# Patient Record
Sex: MALE | Race: WHITE | Employment: OTHER | RURAL
[De-identification: names, ages, dates, MRNs, and addresses within clinical notes are randomized per-mention and may not be internally consistent; named-entity substitution may affect disease eponyms.]

---

## 2017-01-20 RX ORDER — RIVAROXABAN 20 MG/1
TABLET, FILM COATED ORAL
Qty: 90 TAB | Refills: 2 | Status: SHIPPED | OUTPATIENT
Start: 2017-01-20 | End: 2017-10-17 | Stop reason: SDUPTHER

## 2017-01-20 RX ORDER — METOPROLOL SUCCINATE 25 MG/1
TABLET, EXTENDED RELEASE ORAL
Qty: 90 TAB | Refills: 2 | Status: SHIPPED | OUTPATIENT
Start: 2017-01-20 | End: 2017-10-17 | Stop reason: SDUPTHER

## 2017-04-03 ENCOUNTER — OFFICE VISIT (OUTPATIENT)
Dept: CARDIOLOGY CLINIC | Age: 77
End: 2017-04-03

## 2017-04-03 VITALS
DIASTOLIC BLOOD PRESSURE: 70 MMHG | RESPIRATION RATE: 18 BRPM | OXYGEN SATURATION: 98 % | HEART RATE: 70 BPM | HEIGHT: 70 IN | WEIGHT: 167 LBS | BODY MASS INDEX: 23.91 KG/M2 | SYSTOLIC BLOOD PRESSURE: 110 MMHG

## 2017-04-03 DIAGNOSIS — E11.9 TYPE 2 DIABETES MELLITUS WITHOUT COMPLICATION, WITHOUT LONG-TERM CURRENT USE OF INSULIN (HCC): ICD-10-CM

## 2017-04-03 DIAGNOSIS — Z98.61 S/P PTCA (PERCUTANEOUS TRANSLUMINAL CORONARY ANGIOPLASTY): ICD-10-CM

## 2017-04-03 DIAGNOSIS — I10 ESSENTIAL HYPERTENSION: ICD-10-CM

## 2017-04-03 DIAGNOSIS — I48.0 PAROXYSMAL ATRIAL FIBRILLATION (HCC): ICD-10-CM

## 2017-04-03 DIAGNOSIS — I25.10 CORONARY ARTERY DISEASE INVOLVING NATIVE CORONARY ARTERY OF NATIVE HEART WITHOUT ANGINA PECTORIS: Primary | ICD-10-CM

## 2017-04-03 RX ORDER — ESCITALOPRAM OXALATE 20 MG/1
20 TABLET ORAL DAILY
COMMUNITY

## 2017-04-03 RX ORDER — ASPIRIN 81 MG/1
81 TABLET ORAL DAILY
Qty: 90 TAB | Refills: 3
Start: 2017-04-03

## 2017-04-03 NOTE — PROGRESS NOTES
Verified patient with two patient identifiers. Medications reviewed/approved by Dr. Krysten Lucas. Verbal from Dr. Krysten Lucas to remove the medications that were deleted during the visit.

## 2017-04-03 NOTE — MR AVS SNAPSHOT
Visit Information Date & Time Provider Department Dept. Phone Encounter #  
 4/3/2017  2:00 PM Patria Zuleta, [address] [facility] [place] [phone] [id number] Your Appointments 10/10/2017 10:00 AM  
ESTABLISHED PATIENT with Patria Zuleta MD  
[address] [facility] [place] [facility]) Appt Note: 6 mo fu $0cp [address] [facility] [phone]  
  
   
 [address] [place], [place] [id number] Upcoming Health Maintenance Date Due HEMOGLOBIN A1C Q6M 1940 FOOT EXAM Q1 7/19/1950 MICROALBUMIN Q1 7/19/1950 EYE EXAM RETINAL OR DILATED Q1 7/19/1950 DTaP/Tdap/Td series (1 - Tdap) 7/19/1961 ZOSTER VACCINE AGE 60> 7/19/2000 GLAUCOMA SCREENING Q2Y 7/19/2005 Pneumococcal 65+ Low/Medium Risk (1 of 2 - PCV13) 7/19/2005 MEDICARE YEARLY EXAM 7/19/2005 INFLUENZA AGE 9 TO ADULT 8/1/2016 LIPID PANEL Q1 3/16/2017 Allergies as of 4/3/2017  Review Complete On: 4/3/2017 By: Patria Zuleta MD  
 No Known Allergies Current Immunizations  Reviewed on 3/15/2016 No immunizations on file. Not reviewed this visit You Were Diagnosed With   
  
 Codes Comments Coronary artery disease involving native coronary artery of native heart without angina pectoris    -  Primary ICD-10-CM: I25.10 ICD-9-CM: 414.01 Paroxysmal atrial fibrillation (HCC)     ICD-10-CM: I48.0 ICD-9-CM: 427.31 Essential hypertension     ICD-10-CM: I10 
ICD-9-CM: 401.9 S/P PTCA (percutaneous transluminal coronary angioplasty)     ICD-10-CM: Z98.61 ICD-9-CM: V45.82 Type 2 diabetes mellitus without complication, without long-term current use of insulin (HCC)     ICD-10-CM: E11.9 ICD-9-CM: 250.00 Vitals  BP Pulse Resp Height(growth percentile) Weight(growth percentile) SpO2  
 110/70 (BP 1 Location: Left arm, BP Patient Position: Sitting) 70 18 5' 10\" (1.778 m) 167 lb (75.8 kg) 98% BMI Smoking Status 23.96 kg/m2 Former Smoker Vitals History BMI and BSA Data Body Mass Index Body Surface Area  
 23.96 kg/m 2 1.93 m 2 Preferred Pharmacy Pharmacy Name Phone [facility] [phone] Your Updated Medication List  
  
   
This list is accurate as of: 4/3/17  2:59 PM.  Always use your most recent med list.  
  
  
  
  
 albuterol 2.5 mg /3 mL (0.083 %) nebulizer solution Commonly known as:  PROVENTIL VENTOLIN  
by Nebulization route once. amLODIPine 5 mg tablet Commonly known as:  Kendall Staggers Take 5 mg by mouth daily. aspirin delayed-release 81 mg tablet Take 1 Tab by mouth daily. atorvastatin 20 mg tablet Commonly known as:  LIPITOR Take 1 Tab by mouth daily. TO HELP PREVENT HEART ATTACK AND STROKE  
  
 escitalopram oxalate 20 mg tablet Commonly known as:  Jovana Cipro Take 20 mg by mouth daily. FLONASE 50 mcg/actuation nasal spray Generic drug:  fluticasone 2 Sprays by Both Nostrils route daily. folic acid 1 mg tablet Commonly known as:  Google Take  by mouth daily. methotrexate 2.5 mg tablet Commonly known as:  Oneida Pieter Take 2.5 mg by mouth. TAKES 4 TABLETS ONCE WEEKLY  
  
 montelukast 10 mg tablet Commonly known as:  SINGULAIR Take 10 mg by mouth daily. OTHER  
TAKES DAILY:  , VITAMIN D3, KRILL OIL, VIT. C, MULTIVITAMIN,  
  
 pantoprazole 40 mg tablet Commonly known as:  PROTONIX Take 1 Tab by mouth Daily (before breakfast). REPLACES PRILOSEC OR OTHER PPI'S DUE TO POTENTIAL INTERACTION WITH PLAVIX  
  
 potassium chloride 20 mEq tablet Commonly known as:  K-DUR, KLOR-CON Take 20 mEq by mouth daily. TOPROL XL 25 mg XL tablet Generic drug:  metoprolol succinate TAKE 1 TABLET DAILY XARELTO 20 mg Tab tablet Generic drug:  rivaroxaban TAKE 1 TABLET DAILY WITH DINNER  
  
 zolpidem 5 mg tablet Commonly known as:  AMBIEN Take  by mouth nightly as needed for Sleep. We Performed the Following AMB POC EKG ROUTINE W/ 12 LEADS, INTER & REP [03128 CPT(R)] To-Do List   
 Around 04/06/2017 Imaging:  NM CARDIAC SPECT W STRS/REST MULT Around 04/06/2017 ECG:  NUCLEAR STRESS TEST Introducing [facility]! Gifty Ansari introduces pyco patient portal. Now you can access parts of your medical record, email your doctor's office, and request medication refills online. 1. In your internet browser, go to [email or web address] 2. Click on the First Time User? Click Here link in the Sign In box. You will see the New Member Sign Up page. 3. Enter your pyco Access Code exactly as it appears below. You will not need to use this code after youve completed the sign-up process. If you do not sign up before the expiration date, you must request a new code. · pyco Access Code: [email or web address] Expires: 7/2/2017  2:58 PM 
 
4. Enter the last four digits of your Social Security Number (xxxx) and Date of Birth (mm/dd/yyyy) as indicated and click Submit. You will be taken to the next sign-up page. 5. Create a pyco ID. This will be your pyco login ID and cannot be changed, so think of one that is secure and easy to remember. 6. Create a pyco password. You can change your password at any time. 7. Enter your Password Reset Question and Answer. This can be used at a later time if you forget your password. 8. Enter your e-mail address. You will receive e-mail notification when new information is available in [address] 9. Click Sign Up. You can now view and download portions of your medical record. 10. Click the Download Summary menu link to download a portable copy of your medical information.  
 
If you have questions, please visit the Frequently Asked Questions section of the [employer]el[employer]. Remember, iyzicohart is NOT to be used for urgent needs. For medical emergencies, dial 911. Now available from your iPhone and Android! Please provide this summary of care documentation to your next provider. Your primary care clinician is listed as Mendez Johnson. If you have any questions after today's visit, please call [phone].

## 2017-04-03 NOTE — PROGRESS NOTES
Declan Waters is a 68 y.o. male is here for routine f/u. No CV sx or complaints. S/p PCI/ANUSHA to prox + mid LAD (ANUSHA x2) 3/16. Continues to see PCP. The patient denies chest pain/ shortness of breath, orthopnea, PND, LE edema, palpitations, syncope, presyncope or fatigue. Patient Active Problem List    Diagnosis Date Noted    CAD (coronary artery disease), native coronary artery 03/16/2016    S/P PTCA (percutaneous transluminal coronary angioplasty) 03/16/2016    Shortness of breath on exertion 03/15/2016    Myocardial ischemia 03/15/2016    DM (diabetes mellitus) ([facility] 02/09/2016    HTN (hypertension) 02/09/2016    Dyslipidemia 02/09/2016    GERD (gastroesophageal reflux disease) 02/09/2016    Ventricular bigeminy 02/09/2016    Paroxysmal atrial fibrillation ([facility] 02/09/2016      Mike Nichole NP  Past Medical History:   Diagnosis Date    Actinic keratosis     DM (diabetes mellitus) ([facility]     Dyslipidemia     Erectile dysfunction     GERD (gastroesophageal reflux disease)     Hypertension     Paroxysmal atrial fibrillation (HCC)     Shingles     Ventricular bigeminy       Past Surgical History:   Procedure Laterality Date    HX CHOLECYSTECTOMY  1995     No Known Allergies   Family History   Problem Relation Age of Onset    Cancer Father      lung      Social History     Social History    Marital status:      Spouse name: N/A    Number of children: N/A    Years of education: N/A     Occupational History    Not on file.      Social History Main Topics    Smoking status: Former Smoker     Quit date: 1/1/1987    Smokeless tobacco: Not on file    Alcohol use No    Drug use: Not on file    Sexual activity: Not on file     Other Topics Concern    Not on file     Social History Narrative      Current Outpatient Prescriptions   Medication Sig    XARELTO 20 mg tab tablet TAKE 1 TABLET DAILY WITH DINNER    TOPROL XL 25 mg XL tablet TAKE 1 TABLET DAILY    fluticasone (FLONASE) 50 mcg/actuation nasal spray 2 Sprays by Both Nostrils route daily.  pantoprazole (PROTONIX) 40 mg tablet Take 1 Tab by mouth Daily (before breakfast). REPLACES PRILOSEC OR OTHER PPI'S DUE TO POTENTIAL INTERACTION WITH PLAVIX    clopidogrel (PLAVIX) 75 mg tablet Take 1 Tab by mouth daily. RISK OF HEART ATTACK IF ANY MISSED DOSES    atorvastatin (LIPITOR) 20 mg tablet Take 1 Tab by mouth daily. TO HELP PREVENT HEART ATTACK AND STROKE    albuterol (PROVENTIL VENTOLIN) 2.5 mg /3 mL (0.083 %) nebulizer solution by Nebulization route once.  zolpidem (AMBIEN) 5 mg tablet Take  by mouth nightly as needed for Sleep.  OTHER TAKES DAILY:   RESVERATROL, VITAMIN D3, KRILL OIL, VIT. C, MULTIVITAMIN, MAGNESIUM, COENZYME Q-10     potassium chloride (K-DUR, KLOR-CON) 20 mEq tablet Take 20 mEq by mouth daily.  montelukast (SINGULAIR) 10 mg tablet Take 10 mg by mouth daily.  folic acid (FOLVITE) 1 mg tablet Take  by mouth daily.  amlodipine (NORVASC) 5 mg tablet Take 5 mg by mouth daily.  lorazepam (ATIVAN) 0.5 mg tablet Take 0.5 mg by mouth nightly.  methotrexate (RHEUMATREX) 2.5 mg tablet Take 2.5 mg by mouth. TAKES 4 TABLETS ONCE WEEKLY     No current facility-administered medications for this visit. Review of Symptoms:    CONST  No weight change. No fever, chills, sweats    ENT No visual changes, URI sx, sore throat    CV  See HPI   RESP  No cough, or sputum, wheezing. Also see HPI   GI  No abdominal pain or change in bowel habits. No heartburn or dysphagia. No melena or rectal bleeding.   No dysuria, urgency, frequency, hematuria   MSKEL  No joint pain, swelling. No muscle pain. SKIN  No rash or lesions. NEURO  No headache, syncope, or seizure. No weakness, loss of sensation, or paresthesias. PSYCH  No low mood or depression  No anxiety. HE/LYMPH  No easy bruising, abnormal bleeding, or enlarged glands.         Physical ExamPhysical Exam:    Visit Vitals    Resp 18    Ht 5' 10\" (1.778 m)    Wt 167 lb (75.8 kg)    BMI 23.96 kg/m2     Gen: NAD  HEENT:  PERRL, throat clear  Neck: no mass or thyromegaly, no JVD   Heart:  Regular,Nl S1S2,  no murmur, gallop or rub.   Lungs:  clear  Abdomen:   Soft, non-tender, bowel sounds are active.   Extremities:  No edema  Pulse: symmetric  Neuro: A&O times 3, WNL      Cardiographics    ECG: normal EKG, normal sinus rhythm, unchanged from previous tracings      Labs:   Lab Results   Component Value Date/Time    Sodium 133 03/16/2016 04:30 AM    Sodium 134 03/15/2016 10:10 PM    Potassium 3.9 03/16/2016 04:30 AM    Potassium 4.1 03/15/2016 10:10 PM    Chloride 98 03/16/2016 04:30 AM    Chloride 98 03/15/2016 10:10 PM    CO2 26 03/16/2016 04:30 AM    CO2 27 03/15/2016 10:10 PM    Anion gap 9 03/16/2016 04:30 AM    Anion gap 9 03/15/2016 10:10 PM    Glucose 98 03/16/2016 04:30 AM    Glucose 154 03/15/2016 10:10 PM    BUN 8 03/16/2016 04:30 AM    BUN 7 03/15/2016 10:10 PM    Creatinine 0.53 03/16/2016 04:30 AM    Creatinine 0.68 03/15/2016 10:10 PM    BUN/Creatinine ratio 15 03/16/2016 04:30 AM    BUN/Creatinine ratio 10 03/15/2016 10:10 PM    GFR est AA >60 03/16/2016 04:30 AM    GFR est AA >60 03/15/2016 10:10 PM    GFR est non-AA >60 03/16/2016 04:30 AM    GFR est non-AA >60 03/15/2016 10:10 PM    Calcium 8.4 03/16/2016 04:30 AM    Calcium 8.3 03/15/2016 10:10 PM     No results found for: CPK, CPKX, CPX  Lab Results   Component Value Date/Time    Cholesterol, total 120 03/16/2016 04:30 AM    HDL Cholesterol 29 03/16/2016 04:30 AM    LDL, calculated 68.8 03/16/2016 04:30 AM    Triglyceride 111 03/16/2016 04:30 AM    CHOL/HDL Ratio 4.1 03/16/2016 04:30 AM     No results found for this or any previous visit.     Assessment:         Patient Active Problem List    Diagnosis Date Noted    CAD (coronary artery disease), native coronary artery 03/16/2016    S/P PTCA (percutaneous transluminal coronary angioplasty) 03/16/2016    Shortness of breath on exertion 03/15/2016    Myocardial ischemia 03/15/2016    DM (diabetes mellitus) ([facility] 02/09/2016    HTN (hypertension) 02/09/2016    Dyslipidemia 02/09/2016    GERD (gastroesophageal reflux disease) 02/09/2016    Ventricular bigeminy 02/09/2016    Paroxysmal atrial fibrillation ([facility] 02/09/2016     No CV sx or complaints. S/p PCI/ANUSHA to prox + mid LAD (ANUSHA x2) 3/16. Continues to see PCP. Plan:     Doing well with no adverse cardiac symptoms. Now one year post PCI/ANUSHA, so can stop the Plavix and restart ASA 81 (is still on Xarelto). Plan repeat Stress MPI (one year post PCI, no real anginal sx prior to PCI and 99 prox LAD in setting of DM). Lipids and labs followed by PCP. Continue current care and f/u in 6 months.     Anjana Wyatt MD

## 2017-04-12 ENCOUNTER — TELEPHONE (OUTPATIENT)
Dept: CARDIOLOGY CLINIC | Age: 77
End: 2017-04-12

## 2017-04-12 NOTE — TELEPHONE ENCOUNTER
----- Message from Pablo Goodpasture, MD sent at 4/12/2017  2:27 PM EDT -----  Regarding: stress MPI  Call/advise stress nuclear scan looks normal, no blockages seen, normal pumping function.   Thanks [facility]

## 2017-10-02 ENCOUNTER — OFFICE VISIT (OUTPATIENT)
Dept: CARDIOLOGY CLINIC | Age: 77
End: 2017-10-02

## 2017-10-02 VITALS
HEART RATE: 68 BPM | OXYGEN SATURATION: 97 % | WEIGHT: 165 LBS | BODY MASS INDEX: 23.62 KG/M2 | RESPIRATION RATE: 18 BRPM | DIASTOLIC BLOOD PRESSURE: 72 MMHG | SYSTOLIC BLOOD PRESSURE: 118 MMHG | HEIGHT: 70 IN

## 2017-10-02 DIAGNOSIS — E11.9 TYPE 2 DIABETES MELLITUS WITHOUT COMPLICATION, WITHOUT LONG-TERM CURRENT USE OF INSULIN (HCC): ICD-10-CM

## 2017-10-02 DIAGNOSIS — I10 ESSENTIAL HYPERTENSION: ICD-10-CM

## 2017-10-02 DIAGNOSIS — Z98.61 S/P PTCA (PERCUTANEOUS TRANSLUMINAL CORONARY ANGIOPLASTY): ICD-10-CM

## 2017-10-02 DIAGNOSIS — E78.5 DYSLIPIDEMIA: ICD-10-CM

## 2017-10-02 DIAGNOSIS — I25.10 CORONARY ARTERY DISEASE INVOLVING NATIVE CORONARY ARTERY OF NATIVE HEART WITHOUT ANGINA PECTORIS: Primary | ICD-10-CM

## 2017-10-02 DIAGNOSIS — I48.0 PAROXYSMAL ATRIAL FIBRILLATION (HCC): ICD-10-CM

## 2017-10-02 RX ORDER — GLUCOSAMINE SULFATE 1500 MG
POWDER IN PACKET (EA) ORAL DAILY
COMMUNITY

## 2017-10-02 RX ORDER — BISMUTH SUBSALICYLATE 262 MG
1 TABLET,CHEWABLE ORAL DAILY
COMMUNITY
End: 2018-04-18 | Stop reason: ALTCHOICE

## 2017-10-02 NOTE — MR AVS SNAPSHOT
Visit Information Date & Time Provider Department Dept. Phone Encounter #  
 10/2/2017  3:00 PM Dina Gonzalez, 1024 [facility] [place] [facility] [phone] [id number] Upcoming Health Maintenance Date Due HEMOGLOBIN A1C Q6M 1940 FOOT EXAM Q1 7/19/1950 MICROALBUMIN Q1 7/19/1950 EYE EXAM RETINAL OR DILATED Q1 7/19/1950 DTaP/Tdap/Td series (1 - Tdap) 7/19/1961 ZOSTER VACCINE AGE 60> 5/19/2000 GLAUCOMA SCREENING Q2Y 7/19/2005 Pneumococcal 65+ Low/Medium Risk (1 of 2 - PCV13) 7/19/2005 MEDICARE YEARLY EXAM 7/19/2005 LIPID PANEL Q1 3/16/2017 INFLUENZA AGE 9 TO ADULT 8/1/2017 Allergies as of 10/2/2017  Review Complete On: 10/2/2017 By: Dina Gonzalez MD  
 No Known Allergies Current Immunizations  Reviewed on 3/15/2016 No immunizations on file. Not reviewed this visit You Were Diagnosed With   
  
 Codes Comments Coronary artery disease involving native coronary artery of native heart without angina pectoris    -  Primary ICD-10-CM: I25.10 ICD-9-CM: 414.01 Paroxysmal atrial fibrillation (HCC)     ICD-10-CM: I48.0 ICD-9-CM: 427.31 Essential hypertension     ICD-10-CM: I10 
ICD-9-CM: 401.9 S/P PTCA (percutaneous transluminal coronary angioplasty)     ICD-10-CM: Z98.61 ICD-9-CM: V45.82 Type 2 diabetes mellitus without complication, without long-term current use of insulin (HCC)     ICD-10-CM: E11.9 ICD-9-CM: 250.00 Dyslipidemia     ICD-10-CM: E78.5 ICD-9-CM: 272.4 Vitals BP Pulse Resp Height(growth percentile) Weight(growth percentile) SpO2  
 118/72 (BP 1 Location: Left arm, BP Patient Position: Sitting) 68 18 5' 10\" (1.778 m) 165 lb (74.8 kg) 97% BMI Smoking Status 23.68 kg/m2 Former Smoker Vitals History BMI and BSA Data Body Mass Index Body Surface Area  
 23.68 kg/m 2 1.92 m 2 Preferred Pharmacy Pharmacy Name Phone [address] [facility] [phone] Your Updated Medication List  
  
   
This list is accurate as of: 10/2/17  3:35 PM.  Always use your most recent med list.  
  
  
  
  
 albuterol 2.5 mg /3 mL (0.083 %) nebulizer solution Commonly known as:  PROVENTIL VENTOLIN  
by Nebulization route once. amLODIPine 5 mg tablet Commonly known as:  Suraj Janes Take 5 mg by mouth daily. aspirin delayed-release 81 mg tablet Take 1 Tab by mouth daily. atorvastatin 20 mg tablet Commonly known as:  LIPITOR Take 1 Tab by mouth daily. TO HELP PREVENT HEART ATTACK AND STROKE  
  
 escitalopram oxalate 20 mg tablet Commonly known as:  Naveed Leriche Take 20 mg by mouth daily. FLONASE 50 mcg/actuation nasal spray Generic drug:  fluticasone 2 Sprays by Both Nostrils route daily. folic acid 1 mg tablet Commonly known as:  Katlin Take  by mouth daily. methotrexate 2.5 mg tablet Commonly known as:  Ozzie Ghotra Take 2.5 mg by mouth. TAKES 4 TABLETS ONCE WEEKLY  
  
 montelukast 10 mg tablet Commonly known as:  SINGULAIR Take 10 mg by mouth daily. multivitamin tablet Commonly known as:  ONE A DAY Take 1 Tab by mouth daily. OTHER  
TAKES DAILY:  , VITAMIN D3, KRILL OIL, VIT. C, MULTIVITAMIN,  
  
 pantoprazole 40 mg tablet Commonly known as:  PROTONIX Take 1 Tab by mouth Daily (before breakfast). REPLACES PRILOSEC OR OTHER PPI'S DUE TO POTENTIAL INTERACTION WITH PLAVIX  
  
 potassium chloride 20 mEq tablet Commonly known as:  K-DUR, KLOR-CON Take 20 mEq by mouth daily. TOPROL XL 25 mg XL tablet Generic drug:  metoprolol succinate TAKE 1 TABLET DAILY  
  
 VITAMIN C PO Take  by mouth. VITAMIN D3 1,000 unit Cap Generic drug:  cholecalciferol Take  by mouth daily. XARELTO 20 mg Tab tablet Generic drug:  rivaroxaban TAKE 1 TABLET DAILY WITH DINNER  
  
 zolpidem 5 mg tablet Commonly known as:  AMBIEN Take  by mouth nightly as needed for Sleep. We Performed the Following AMB POC EKG ROUTINE W/ 12 LEADS, INTER & REP [79441 CPT(R)] Introducing [facility]! [facility] introduces [employer] patient portal. Now you can access parts of your medical record, email your doctor's office, and request medication refills online. 1. In your internet browser, go to [email or web address] 2. Click on the First Time User? Click Here link in the Sign In box. You will see the New Member Sign Up page. 3. Enter your [employer] Access Code exactly as it appears below. You will not need to use this code after youve completed the sign-up process. If you do not sign up before the expiration date, you must request a new code. · [employer] Access Code: [facility] Expires: 12/31/2017  2:35 PM 
 
4. Enter the last four digits of your Social Security Number (xxxx) and Date of Birth (mm/dd/yyyy) as indicated and click Submit. You will be taken to the next sign-up page. 5. Create a [employer] ID. This will be your [employer] login ID and cannot be changed, so think of one that is secure and easy to remember. 6. Create a [employer] password. You can change your password at any time. 7. Enter your Password Reset Question and Answer. This can be used at a later time if you forget your password. 8. Enter your e-mail address. You will receive e-mail notification when new information is available in [address] 9. Click Sign Up. You can now view and download portions of your medical record. 10. Click the Download Summary menu link to download a portable copy of your medical information. If you have questions, please visit the Frequently Asked Questions section of the [employer] website. Remember, [employer] is NOT to be used for urgent needs. For medical emergencies, dial 911. Now available from your iPhone and Android!

## 2017-10-02 NOTE — PROGRESS NOTES
Amairani Garvin is a 68 y.o. male is here for routine f/u. No CV sx or complaints. No afib he is aware of. Atorvastatin dose reduced by PCP. Continues on ASA 81 and Xarelto. The patient denies chest pain/ shortness of breath, orthopnea, PND, LE edema, palpitations, syncope, presyncope or fatigue. Patient Active Problem List    Diagnosis Date Noted    CAD (coronary artery disease), native coronary artery 03/16/2016    S/P PTCA (percutaneous transluminal coronary angioplasty) 03/16/2016    Shortness of breath on exertion 03/15/2016    Myocardial ischemia 03/15/2016    DM [facility]di[facility] mellitus[facility] 02/09/2016    HTN (hypertension) 02/09/2016    Dyslipidemia 02/09/2016    GERD (gastroesophageal reflux disease) 02/09/2016    Ventricular bigeminy 02/09/2016    [facility] 02/09/2016      Corinne Kumari NP  Past Medical History:   Diagnosis Date    Actinic keratosis     [facility]     Dyslipidemia     Erectile dysfunction     GERD (gastroesophageal reflux disease)     Hypertension     Paroxysmal atrial fibrillation (HCC)     Shingles     Ventricular bigeminy       Past Surgical History:   Procedure Laterality Date    HX CHOLECYSTECTOMY  1995     No Known Allergies   Family History   Problem Relation Age of Onset    Cancer Father      lung      Social History     Social History    Marital status:      Spouse name: N/A    Number of children: N/A    Years of education: N/A     Occupational History    Not on file. Social History Main Topics    Smoking status: Former Smoker     Quit date: 1/1/1987    Smokeless tobacco: Not on file    Alcohol use No    Drug use: Not on file    Sexual activity: Not on file     Other Topics Concern    Not on file     Social History Narrative      Current Outpatient Prescriptions   Medication Sig    multivitamin (ONE A DAY) tablet Take 1 Tab by mouth daily.     ASCORBATE CALCIUM (VITAMIN C PO) Take by mouth.  cholecalciferol (VITAMIN D3) 1,000 unit cap Take  by mouth daily.  escitalopram oxalate (LEXAPRO) 20 mg tablet Take 20 mg by mouth daily.  aspirin delayed-release 81 mg tablet Take 1 Tab by mouth daily.  XARELTO 20 mg tab tablet TAKE 1 TABLET DAILY WITH DINNER    TOPROL XL 25 mg XL tablet TAKE 1 TABLET DAILY    fluticasone (FLONASE) 50 mcg/actuation nasal spray 2 Sprays by Both Nostrils route daily.  pantoprazole (PROTONIX) 40 mg tablet Take 1 Tab by mouth Daily (before breakfast). REPLACES PRILOSEC OR OTHER PPI'S DUE TO POTENTIAL INTERACTION WITH PLAVIX    atorvastatin (LIPITOR) 20 mg tablet Take 1 Tab by mouth daily. TO HELP PREVENT HEART ATTACK AND STROKE (Patient taking differently: Take 10 mg by mouth daily. TO HELP PREVENT HEART ATTACK AND STROKE)    albuterol (PROVENTIL VENTOLIN) 2.5 mg /3 mL (0.083 %) nebulizer solution by Nebulization route once.  zolpidem (AMBIEN) 5 mg tablet Take  by mouth nightly as needed for Sleep.  OTHER TAKES DAILY:  , VITAMIN D3, KRILL OIL, VIT. C, MULTIVITAMIN,    potassium chloride (K-DUR, KLOR-CON) 20 mEq tablet Take 20 mEq by mouth daily.  montelukast (SINGULAIR) 10 mg tablet Take 10 mg by mouth daily.  folic acid (FOLVITE) 1 mg tablet Take  by mouth daily.  amlodipine (NORVASC) 5 mg tablet Take 5 mg by mouth daily.  methotrexate (RHEUMATREX) 2.5 mg tablet Take 2.5 mg by mouth. TAKES 4 TABLETS ONCE WEEKLY     No current facility-administered medications for this visit. Review of Symptoms:    CONST  No weight change. No fever, chills, sweats    ENT No visual changes, URI sx, sore throat    CV  See HPI   RESP  No cough, or sputum, wheezing. Also see HPI   GI  No abdominal pain or change in bowel habits. No heartburn or dysphagia. No melena or rectal bleeding.   No dysuria, urgency, frequency, hematuria   MSKEL  No joint pain, swelling. No muscle pain. SKIN  No rash or lesions.     NEURO  No headache, syncope, or seizure. No weakness, loss of sensation, or paresthesias. PSYCH  No low mood or depression  No anxiety. HE/LYMPH  No easy bruising, abnormal bleeding, or enlarged glands.         Physical ExamPhysical Exam:    Visit Vitals    /72 (BP 1 Location: Left arm, BP Patient Position: Sitting)    Pulse 68    Resp 18    Ht 5' 10\" (1.778 m)    Wt 165 lb (74.8 kg)    SpO2 97%    BMI 23.68 kg/m2     Gen: NAD  HEENT:  PERRL, throat clear  Neck: no adenopathy, no thyromegaly, no JVD   Heart:  Regular,Nl S1S2,  no murmur, gallop or rub.   Lungs:  clear  Abdomen:   Soft, non-tender, bowel sounds are active.   Extremities:  No edema  Pulse: symmetric  Neuro: A&O times 3, No focal neuro deficits    Cardiographics    ECG: NSR, first degree AV block    Labs:   Lab Results   Component Value Date/Time    Sodium 133 03/16/2016 04:30 AM    Sodium 134 03/15/2016 10:10 PM    Potassium 3.9 03/16/2016 04:30 AM    Potassium 4.1 03/15/2016 10:10 PM    Chloride 98 03/16/2016 04:30 AM    Chloride 98 03/15/2016 10:10 PM    CO2 26 03/16/2016 04:30 AM    CO2 27 03/15/2016 10:10 PM    Anion gap 9 03/16/2016 04:30 AM    Anion gap 9 03/15/2016 10:10 PM    Glucose 98 03/16/2016 04:30 AM    Glucose 154 03/15/2016 10:10 PM    BUN 8 03/16/2016 04:30 AM    BUN 7 03/15/2016 10:10 PM    Creatinine 0.53 03/16/2016 04:30 AM    Creatinine 0.68 03/15/2016 10:10 PM    BUN/Creatinine ratio 15 03/16/2016 04:30 AM    BUN/Creatinine ratio 10 03/15/2016 10:10 PM    GFR est AA >60 03/16/2016 04:30 AM    GFR est AA >60 03/15/2016 10:10 PM    GFR est non-AA >60 03/16/2016 04:30 AM    GFR est non-AA >60 03/15/2016 10:10 PM    Calcium 8.4 03/16/2016 04:30 AM    Calcium 8.3 03/15/2016 10:10 PM     No results found for: CPK, CPKX, CPX  Lab Results   Component Value Date/Time    Cholesterol, total 120 03/16/2016 04:30 AM    HDL Cholesterol 29 03/16/2016 04:30 AM    LDL, calculated 68.8 03/16/2016 04:30 AM    Triglyceride 111 03/16/2016 04:30 AM    CHOL/HDL Ratio 4.1 03/16/2016 04:30 AM     No results found for this or any previous visit. Assessment:         Patient Active Problem List    Diagnosis Date Noted    CAD (coronary artery disease), native coronary artery 03/16/2016    S/P PTCA (percutaneous transluminal coronary angioplasty) 03/16/2016    Shortness of breath on exertion 03/15/2016    Myocardial ischemia 03/15/2016    [facility] 02/09/2016    HTN (hypertension) 02/09/2016    Dyslipidemia 02/09/2016    GERD (gastroesophageal reflux disease) 02/09/2016    Ventricular bigeminy 02/09/2016    Paroxysmal atrial fibrillation ([facility] 02/09/2016     No CV sx or complaints. No afib he is aware of. Atorvastatin dose reduced by PCP. Continues on ASA 81 and Xarelto. Plan:     Doing well with no adverse cardiac symptoms. Lipids and labs followed by PCP. Continue current care and f/u in 6 months.     Ema Fritz MD

## 2017-10-17 RX ORDER — METOPROLOL SUCCINATE 25 MG/1
TABLET, EXTENDED RELEASE ORAL
Qty: 90 TAB | Refills: 2 | Status: SHIPPED | OUTPATIENT
Start: 2017-10-17 | End: 2018-07-14 | Stop reason: SDUPTHER

## 2017-10-17 RX ORDER — RIVAROXABAN 20 MG/1
TABLET, FILM COATED ORAL
Qty: 90 TAB | Refills: 2 | Status: SHIPPED | OUTPATIENT
Start: 2017-10-17 | End: 2018-07-14 | Stop reason: SDUPTHER

## 2018-04-18 ENCOUNTER — OFFICE VISIT (OUTPATIENT)
Dept: CARDIOLOGY CLINIC | Age: 78
End: 2018-04-18

## 2018-04-18 VITALS
OXYGEN SATURATION: 97 % | BODY MASS INDEX: 25.34 KG/M2 | SYSTOLIC BLOOD PRESSURE: 122 MMHG | RESPIRATION RATE: 18 BRPM | WEIGHT: 177 LBS | DIASTOLIC BLOOD PRESSURE: 68 MMHG | HEART RATE: 66 BPM | HEIGHT: 70 IN

## 2018-04-18 DIAGNOSIS — E11.9 TYPE 2 DIABETES MELLITUS WITHOUT COMPLICATION, WITHOUT LONG-TERM CURRENT USE OF INSULIN (HCC): ICD-10-CM

## 2018-04-18 DIAGNOSIS — I25.10 CORONARY ARTERY DISEASE INVOLVING NATIVE CORONARY ARTERY OF NATIVE HEART WITHOUT ANGINA PECTORIS: Primary | ICD-10-CM

## 2018-04-18 DIAGNOSIS — E78.5 DYSLIPIDEMIA: ICD-10-CM

## 2018-04-18 DIAGNOSIS — Z98.61 S/P PTCA (PERCUTANEOUS TRANSLUMINAL CORONARY ANGIOPLASTY): ICD-10-CM

## 2018-04-18 DIAGNOSIS — I10 ESSENTIAL HYPERTENSION: ICD-10-CM

## 2018-04-18 DIAGNOSIS — I48.0 PAROXYSMAL ATRIAL FIBRILLATION (HCC): ICD-10-CM

## 2018-04-18 RX ORDER — BISMUTH SUBSALICYLATE 262 MG
1 TABLET,CHEWABLE ORAL DAILY
COMMUNITY

## 2018-04-18 NOTE — PROGRESS NOTES
Verified patient with two patient identifiers. Medications reviewed/approved by Dr. John Salgado. Verbal from Dr. John Salgado to remove the medications that were deleted during the visit. Chief Complaint   Patient presents with    Coronary Artery Disease     6 month follow up    Irregular Heart Beat    Hypertension    Cholesterol Problem     1. Have you been to the ER, urgent care clinic since your last visit? Hospitalized since your last visit?no    2. Have you seen or consulted any other health care providers outside of the [facility] since your last visit? Include any pap smears or colon screening.  Yes, Mary Mccain NP (few weeks ago per the pt.)

## 2018-04-18 NOTE — PROGRESS NOTES
Dante Kirby is a 68 y.o. male is here for routine f/u. No CV sx or complaints. No afib he is aware of. Lipids followed by PCP. Continues on ASA 81 and Xarelto. The patient denies chest pain/ shortness of breath, orthopnea, PND, LE edema, palpitations, syncope, presyncope or fatigue. Patient Active Problem List    Diagnosis Date Noted    CAD (coronary artery disease), native coronary artery 03/16/2016    S/P PTCA (percutaneous transluminal coronary angioplasty) 03/16/2016    Shortness of breath on exertion 03/15/2016    Myocardial ischemia 03/15/2016    DM [facility]di[facility] mellitus) [facility] 02/09/2016    HTN (hypertension) 02/09/2016    Dyslipidemia 02/09/2016    GERD (gastroesophageal reflux disease) 02/09/2016    Ventricular bigeminy 02/09/2016    Paroxysmal atrial fibrillation ([facility] 02/09/2016      Luke Bo NP  Past Medical History:   Diagnosis Date    Actinic keratosis     [facility]     Dyslipidemia     Erectile dysfunction     GERD (gastroesophageal reflux disease)     Hypertension     Paroxysmal atrial fibrillation (HCC)     Shingles     Ventricular bigeminy       Past Surgical History:   Procedure Laterality Date    HX CHOLECYSTECTOMY  1995     No Known Allergies   Family History   Problem Relation Age of Onset    Cancer Father      lung      Social History     Social History    Marital status:      Spouse name: N/A    Number of children: N/A    Years of education: N/A     Occupational History    Not on file.      Social History Main Topics    Smoking status: Former Smoker     Quit date: 1/1/1987    Smokeless tobacco: Not on file    Alcohol use No    Drug use: Not on file    Sexual activity: Not on file     Other Topics Concern    Not on file     Social History Narrative      Current Outpatient Prescriptions   Medication Sig    TOPROL XL 25 mg XL tablet TAKE 1 TABLET DAILY    XARELTO 20 mg tab tablet TAKE 1 TABLET DAILY WITH DINNER    multivitamin (ONE A DAY) tablet Take 1 Tab by mouth daily.  ASCORBATE CALCIUM (VITAMIN C PO) Take  by mouth.  cholecalciferol (VITAMIN D3) 1,000 unit cap Take  by mouth daily.  escitalopram oxalate (LEXAPRO) 20 mg tablet Take 20 mg by mouth daily.  aspirin delayed-release 81 mg tablet Take 1 Tab by mouth daily.  fluticasone (FLONASE) 50 mcg/actuation nasal spray 2 Sprays by Both Nostrils route daily.  pantoprazole (PROTONIX) 40 mg tablet Take 1 Tab by mouth Daily (before breakfast). REPLACES PRILOSEC OR OTHER PPI'S DUE TO POTENTIAL INTERACTION WITH PLAVIX    atorvastatin (LIPITOR) 20 mg tablet Take 1 Tab by mouth daily. TO HELP PREVENT HEART ATTACK AND STROKE (Patient taking differently: Take 10 mg by mouth daily. TO HELP PREVENT HEART ATTACK AND STROKE)    albuterol (PROVENTIL VENTOLIN) 2.5 mg /3 mL (0.083 %) nebulizer solution by Nebulization route once.  zolpidem (AMBIEN) 5 mg tablet Take  by mouth nightly as needed for Sleep.  OTHER TAKES DAILY:  , VITAMIN D3, KRILL OIL, VIT. C, MULTIVITAMIN,    potassium chloride (K-DUR, KLOR-CON) 20 mEq tablet Take 20 mEq by mouth daily.  montelukast (SINGULAIR) 10 mg tablet Take 10 mg by mouth daily.  folic acid (FOLVITE) 1 mg tablet Take  by mouth daily.  amlodipine (NORVASC) 5 mg tablet Take 5 mg by mouth daily.  methotrexate (RHEUMATREX) 2.5 mg tablet Take 2.5 mg by mouth. TAKES 4 TABLETS ONCE WEEKLY     No current facility-administered medications for this visit. Review of Symptoms:    CONST  No weight change. No fever, chills, sweats    ENT No visual changes, URI sx, sore throat    CV  See HPI   RESP  No cough, or sputum, wheezing. Also see HPI   GI  No abdominal pain or change in bowel habits. No heartburn or dysphagia. No melena or rectal bleeding.   No dysuria, urgency, frequency, hematuria   MSKEL  No joint pain, swelling. No muscle pain. SKIN  No rash or lesions. NEURO  No headache, syncope, or seizure. No weakness, loss of sensation, or paresthesias. PSYCH  No low mood or depression  No anxiety. HE/LYMPH  No easy bruising, abnormal bleeding, or enlarged glands.         Physical ExamPhysical Exam:    Visit Vitals    Resp 18    Ht 5' 10\" (1.778 m)    Wt 177 lb (80.3 kg)    BMI 25.4 kg/m2     Gen: NAD  HEENT:  PERRL, throat clear  Neck: no adenopathy, no thyromegaly, no JVD   Heart:  Regular,Nl S1S2,  no murmur, gallop or rub.   Lungs:  clear  Abdomen:   Soft, non-tender, bowel sounds are active.   Extremities:  No edema  Pulse: symmetric  Neuro: A&O times 3, No focal neuro deficits    Cardiographics    ECG: NSR, no acute changes, first degree AV block    Labs:   Lab Results   Component Value Date/Time    Sodium 133 (L) 03/16/2016 04:30 AM    Sodium 134 (L) 03/15/2016 10:10 PM    Potassium 3.9 03/16/2016 04:30 AM    Potassium 4.1 03/15/2016 10:10 PM    Chloride 98 03/16/2016 04:30 AM    Chloride 98 03/15/2016 10:10 PM    CO2 26 03/16/2016 04:30 AM    CO2 27 03/15/2016 10:10 PM    Anion gap 9 03/16/2016 04:30 AM    Anion gap 9 03/15/2016 10:10 PM    Glucose 98 03/16/2016 04:30 AM    Glucose 154 (H) 03/15/2016 10:10 PM    BUN 8 03/16/2016 04:30 AM    BUN 7 03/15/2016 10:10 PM    Creatinine 0.53 (L) 03/16/2016 04:30 AM    Creatinine 0.68 (L) 03/15/2016 10:10 PM    BUN/Creatinine ratio 15 03/16/2016 04:30 AM    BUN/Creatinine ratio 10 (L) 03/15/2016 10:10 PM    GFR est AA >60 03/16/2016 04:30 AM    GFR est AA >60 03/15/2016 10:10 PM    GFR est non-AA >60 03/16/2016 04:30 AM    GFR est non-AA >60 03/15/2016 10:10 PM    Calcium 8.4 (L) 03/16/2016 04:30 AM    Calcium 8.3 (L) 03/15/2016 10:10 PM     No results found for: CPK, CPKX, CPX  Lab Results   Component Value Date/Time    Cholesterol, total 120 03/16/2016 04:30 AM    HDL Cholesterol 29 03/16/2016 04:30 AM    LDL, calculated 68.8 03/16/2016 04:30 AM    Triglyceride 111 03/16/2016 04:30 AM    CHOL/HDL Ratio 4.1 03/16/2016 04:30 AM     No results found for this or any previous visit. Assessment:         Patient Active Problem List    Diagnosis Date Noted    CAD (coronary artery disease), native coronary artery 03/16/2016    S/P PTCA (percutaneous transluminal coronary angioplasty) 03/16/2016    Shortness of breath on exertion 03/15/2016    Myocardial ischemia 03/15/2016    DM (diabetes mellitus) ([facility] 02/09/2016    HTN (hypertension) 02/09/2016    Dyslipidemia 02/09/2016    GERD (gastroesophageal reflux disease) 02/09/2016    Ventricular bigeminy 02/09/2016    Paroxysmal atrial fibrillation ([facility] 02/09/2016     No CV sx or complaints. No afib he is aware of. Lipids followed by PCP. Continues on ASA 81 and Xarelto. Plan:     Doing well with no adverse cardiac symptoms. Lipids and labs followed by PCP. Continue current care and f/u in 6 months.     Nikunj Youngblood MD

## 2018-04-18 NOTE — MR AVS SNAPSHOT
[address] 
 
 
 [address] [facility] [address] [address] [phone] Patient: Maci Wilkinson MRN: [id number] Visit Information Date & Time Provider Department Dept. Phone Encounter #  
 2018  3:40 PM Marlon Marley, [address] [facility] [phone] [id number] Follow-up Instructions Return in about 6 months (around 10/18/2018). Your Appointments 10/31/2018  1:00 PM  
ESTABLISHED PATIENT with Marlon Marley MD  
[address] [place] [facility] [address] [facility]) Appt Note: 6 mo fu $0cp [address] [facility] [address] [id number] [phone]  
  
   
 [address] [place] [id number] Upcoming Health Maintenance Date Due HEMOGLOBIN A1C Q6M 1940 FOOT EXAM Q1 1950 MICROALBUMIN Q1 1950 EYE EXAM RETINAL OR DILATED Q1 1950 DTaP/Tdap/Td series (1 - Tdap) 1961 ZOSTER VACCINE AGE 60> 2000 GLAUCOMA SCREENING Q2Y 2005 Pneumococcal 65+ Low/Medium Risk (1 of 2 - PCV13) 2005 LIPID PANEL Q1 3/16/2017 Influenza Age 5 to Adult 2017 MEDICARE YEARLY EXAM 3/14/2018 Allergies as of 2018  Review Complete On: 2018 By: Marlon Marley MD  
 No Known Allergies Current Immunizations  Reviewed on 3/15/2016 No immunizations on file. Not reviewed this visit You Were Diagnosed With   
  
 Codes Comments Coronary artery disease involving native coronary artery of native heart without angina pectoris    -  Primary ICD-10-CM: I25.10 ICD-9-CM: 414.01 Essential hypertension     ICD-10-CM: I10 
ICD-9-CM: 401.9 Paroxysmal atrial fibrillation (HCC)     ICD-10-CM: I48.0 ICD-9-CM: 427.31 S/P PTCA (percutaneous transluminal coronary angioplasty)     ICD-10-CM: Z98.61 ICD-9-CM: V45.82 Dyslipidemia     ICD-10-CM: E78.5 ICD-9-CM: 272.4 Type 2 diabetes mellitus without complication, without long-term current use of insulin (HCC)     ICD-10-CM: E11.9 ICD-9-CM: 250.00 Vitals BP Pulse Resp Height(growth percentile) Weight(growth percentile) SpO2  
 122/68 (BP 1 Location: Left arm, BP Patient Position: Sitting) 66 18 5' 10\" (1.778 m) 177 lb (80.3 kg) 97% BMI Smoking Status 25.4 kg/m2 Former Smoker Vitals History BMI and BSA Data Body Mass Index Body Surface Area  
 25.4 kg/m 2 1.99 m 2 Preferred Pharmacy Pharmacy Name Phone Kath Christianson, [facility] [phone] Your Updated Medication List  
  
   
This list is accurate as of 4/18/18  4:18 PM.  Always use your most recent med list.  
  
  
  
  
 albuterol 2.5 mg /3 mL (0.083 %) nebulizer solution Commonly known as:  PROVENTIL VENTOLIN  
by Nebulization route once. amLODIPine 5 mg tablet Commonly known as:  Francisco Inks Take 5 mg by mouth daily. aspirin delayed-release 81 mg tablet Take 1 Tab by mouth daily. atorvastatin 20 mg tablet Commonly known as:  LIPITOR Take 1 Tab by mouth daily. TO HELP PREVENT HEART ATTACK AND STROKE  
  
 escitalopram oxalate 20 mg tablet Commonly known as:  Tessa Bors Take 20 mg by mouth daily. FLONASE 50 mcg/actuation nasal spray Generic drug:  fluticasone 2 Sprays by Both Nostrils route daily. folic acid 1 mg tablet Commonly known as:  Montana Siddiqui Take  by mouth daily. KRILL OIL PO Take  by mouth. methotrexate 2.5 mg tablet Commonly known as:  [place] Take 10 mg by mouth Every Friday. TAKES 4 TABLETS ONCE WEEKLY  
  
 montelukast 10 mg tablet Commonly known as:  SINGULAIR Take 10 mg by mouth daily. multivitamin tablet Commonly known as:  ONE A DAY Take 1 Tab by mouth daily. pantoprazole 40 mg tablet Commonly known as:  PROTONIX Take 1 Tab by mouth Daily (before breakfast). REPLACES PRILOSEC OR OTHER PPI'S DUE TO POTENTIAL INTERACTION WITH PLAVIX  
  
 potassium chloride 20 mEq tablet Commonly known as:  K-DUR, KLOR-CON Take 20 mEq by mouth daily. TOPROL XL 25 mg XL tablet Generic drug:  metoprolol succinate TAKE 1 TABLET DAILY  
  
 VITAMIN C PO Take  by mouth. VITAMIN D3 1,000 unit Cap Generic drug:  cholecalciferol Take  by mouth daily. XARELTO 20 mg Tab tablet Generic drug:  rivaroxaban TAKE 1 TABLET DAILY WITH DINNER  
  
 zolpidem 5 mg tablet Commonly known as:  AMBIEN Take  by mouth nightly as needed for Sleep. We Performed the Following AMB POC EKG ROUTINE W/ 12 LEADS, INTER & REP [32652 CPT(R)] Follow-up Instructions Return in about 6 months (around 10/18/2018). Introducing [facility]! [facility] introduces Rev patient portal. Now you can access parts of your medical record, email your doctor's office, and request medication refills online. 1. In your internet browser, go to [email or web address] 2. Click on the First Time User? Click Here link in the Sign In box. You will see the New Member Sign Up page. 3. Enter your Rev Access Code exactly as it appears below. You will not need to use this code after youve completed the sign-up process. If you do not sign up before the expiration date, you must request a new code. · Rev Access Code: [email or web address] Expires: 7/17/2018  4:18 PM 
 
4. Enter the last four digits of your Social Security Number (xxxx) and Date of Birth (mm/dd/yyyy) as indicated and click Submit. You will be taken to the next sign-up page. 5. Create a Rev ID. This will be your Rev login ID and cannot be changed, so think of one that is secure and easy to remember. 6. Create a Rev password. You can change your password at any time. 7. Enter your Password Reset Question and Answer. This can be used at a later time if you forget your password. 8. Enter your e-mail address. You will receive e-mail notification when new information is available in [address] 9. Click Sign Up. You can now view and download portions of your medical record. 10. Click the Download Summary menu link to download a portable copy of your medical information. If you have questions, please visit the Frequently Asked Questions section of the [employer] website. Remember, [employer] is NOT to be used for urgent needs. For medical emergencies, dial 911. Now available from your iPhone and Android! Please provide this summary of care documentation to your next provider. Your primary care clinician is listed as Felisa Grove. If you have any questions after today's visit, please call [phone].

## 2018-07-16 RX ORDER — RIVAROXABAN 20 MG/1
TABLET, FILM COATED ORAL
Qty: 90 TAB | Refills: 2 | Status: SHIPPED | OUTPATIENT
Start: 2018-07-16 | End: 2019-04-10 | Stop reason: SDUPTHER

## 2018-07-16 RX ORDER — METOPROLOL SUCCINATE 25 MG/1
TABLET, EXTENDED RELEASE ORAL
Qty: 90 TAB | Refills: 2 | Status: SHIPPED | OUTPATIENT
Start: 2018-07-16 | End: 2019-04-10 | Stop reason: SDUPTHER

## 2018-10-31 ENCOUNTER — OFFICE VISIT (OUTPATIENT)
Dept: CARDIOLOGY CLINIC | Age: 78
End: 2018-10-31

## 2018-10-31 VITALS
HEIGHT: 70 IN | DIASTOLIC BLOOD PRESSURE: 78 MMHG | WEIGHT: 180 LBS | OXYGEN SATURATION: 96 % | BODY MASS INDEX: 25.77 KG/M2 | HEART RATE: 76 BPM | SYSTOLIC BLOOD PRESSURE: 128 MMHG | RESPIRATION RATE: 18 BRPM

## 2018-10-31 DIAGNOSIS — I25.10 CORONARY ARTERY DISEASE INVOLVING NATIVE CORONARY ARTERY OF NATIVE HEART WITHOUT ANGINA PECTORIS: Primary | ICD-10-CM

## 2018-10-31 DIAGNOSIS — Z98.61 S/P PTCA (PERCUTANEOUS TRANSLUMINAL CORONARY ANGIOPLASTY): ICD-10-CM

## 2018-10-31 DIAGNOSIS — I48.0 PAROXYSMAL ATRIAL FIBRILLATION (HCC): ICD-10-CM

## 2018-10-31 DIAGNOSIS — E78.5 DYSLIPIDEMIA: ICD-10-CM

## 2018-10-31 DIAGNOSIS — E11.9 TYPE 2 DIABETES MELLITUS WITHOUT COMPLICATION, WITHOUT LONG-TERM CURRENT USE OF INSULIN (HCC): ICD-10-CM

## 2018-10-31 DIAGNOSIS — I10 ESSENTIAL HYPERTENSION: ICD-10-CM

## 2018-10-31 NOTE — PROGRESS NOTES
Jose L Saravia is a 66 y.o. male is here for routine f/u. No CV sx or complaints. No afib he is aware of. Lipids followed by PCP.  Continues on ASA 81 and Xarelto. The patient denies chest pain/ shortness of breath, orthopnea, PND, LE edema, palpitations, syncope, presyncope or fatigue. Patient Active Problem List  
 Diagnosis Date Noted  CAD (coronary artery disease), native coronary artery 2016  S/P PTCA (percutaneous transluminal coronary angioplasty) 2016  Shortness of breath on exertion 03/15/2016  Myocardial ischemia 03/15/2016  DM (diabetes mellitus) ([facility] 2016  
 HTN (hypertension) 2016  Dyslipidemia 2016  GERD (gastroesophageal reflux disease) 2016  Ventricular bigeminy 2016  Paroxysmal atrial fibrillation ([facility] 2016 Marleny Nelson NP Past Medical History:  
Diagnosis Date  Actinic keratosis  [facility]  Dyslipidemia  Erectile dysfunction  GERD (gastroesophageal reflux disease)  Hypertension  Paroxysmal atrial fibrillation (HCC)  Shingles  Ventricular bigeminy Past Surgical History:  
Procedure Laterality Date [address] No Known Allergies Family History Problem Relation Age of Onset  Cancer Father   
     lung Social History Socioeconomic History  Marital status:  Spouse name: Not on file  Number of children: Not on file  Years of education: Not on file  Highest education level: Not on file Social Needs  Financial resource strain: Not on file  Food insecurity - worry: Not on file  Food insecurity - inability: Not on file  Transportation needs - medical: Not on file  Transportation needs - non-medical: Not on file Occupational History  Not on file Tobacco Use  Smoking status: Former Smoker Last attempt to quit: 1987 Years since quittin.8  Smokeless tobacco: Former User Types: Chew Quit date: 10/31/1990 Substance and Sexual Activity  Alcohol use: No  
  Alcohol/week: 0.0 oz  Drug use: Not on file  Sexual activity: Not on file Other Topics Concern  Not on file Social History Narrative  Not on file Current Outpatient Medications Medication Sig  TOPROL XL 25 mg XL tablet TAKE 1 TABLET DAILY  XARELTO 20 mg tab tablet TAKE 1 TABLET DAILY WITH DINNER  
 multivitamin (ONE A DAY) tablet Take 1 Tab by mouth daily.  KRILL OIL PO Take  by mouth.  ASCORBATE CALCIUM (VITAMIN C PO) Take  by mouth.  cholecalciferol (VITAMIN D3) 1,000 unit cap Take  by mouth daily.  escitalopram oxalate (LEXAPRO) 20 mg tablet Take 20 mg by mouth daily.  aspirin delayed-release 81 mg tablet Take 1 Tab by mouth daily.  fluticasone (FLONASE) 50 mcg/actuation nasal spray 2 Sprays by Both Nostrils route daily.  pantoprazole (PROTONIX) 40 mg tablet Take 1 Tab by mouth Daily (before breakfast). REPLACES PRILOSEC OR OTHER PPI'S DUE TO POTENTIAL INTERACTION WITH PLAVIX  atorvastatin (LIPITOR) 20 mg tablet Take 1 Tab by mouth daily. TO HELP PREVENT HEART ATTACK AND STROKE (Patient taking differently: Take 10 mg by mouth daily. TO HELP PREVENT HEART ATTACK AND STROKE)  zolpidem (AMBIEN) 5 mg tablet Take  by mouth nightly as needed for Sleep.  potassium chloride (K-DUR, KLOR-CON) 20 mEq tablet Take 20 mEq by mouth daily.  montelukast (SINGULAIR) 10 mg tablet Take 10 mg by mouth daily.  folic acid (FOLVITE) 1 mg tablet Take  by mouth daily.  amlodipine (NORVASC) 5 mg tablet Take 5 mg by mouth daily.  methotrexate (RHEUMATREX) 2.5 mg tablet Take 10 mg by mouth Every Friday. TAKES 4 TABLETS ONCE WEEKLY  albuterol (PROVENTIL VENTOLIN) 2.5 mg /3 mL (0.083 %) nebulizer solution by Nebulization route once. No current facility-administered medications for this visit. Review of Symptoms: CONST  No weight change. No fever, chills, sweats ENT No visual changes, URI sx, sore throat CV  See HPI  
RESP  No cough, or sputum, wheezing. Also see HPI  
GI  No abdominal pain or change in bowel habits. No heartburn or dysphagia. No melena or rectal bleeding.   No dysuria, urgency, frequency, hematuria MSKEL  No joint pain, swelling. No muscle pain. SKIN  No rash or lesions. NEURO  No headache, syncope, or seizure. No weakness, loss of sensation, or paresthesias. PSYCH  No low mood or depression No anxiety. HE/LYMPH  No easy bruising, abnormal bleeding, or enlarged glands. Physical ExamPhysical Exam:   
Visit Vitals /78 (BP 1 Location: Left arm, BP Patient Position: Sitting) Pulse 76 Resp 18 Ht 5' 10\" (1.778 m) Wt 180 lb (81.6 kg) SpO2 96% Comment: ra  
BMI 25.83 kg/m² Gen: NAD HEENT:  PERRL, throat clear Neck: no adenopathy, no thyromegaly, no JVD Heart:  Regular,Nl S1S2,  no murmur, gallop or rub.  
Lungs:  clear Abdomen:   Soft, non-tender, bowel sounds are active.  
Extremities:  No edema Pulse: symmetric Neuro: A&O times 3, No focal neuro deficits Cardiographics ECG: NSR, first degree AV block, no change from previous Labs:  
Lab Results Component Value Date/Time  Sodium 133 (L) 03/16/2016 04:30 AM  
 Sodium 134 (L) 03/15/2016 10:10 PM  
 Potassium 3.9 03/16/2016 04:30 AM  
 Potassium 4.1 03/15/2016 10:10 PM  
 Chloride 98 03/16/2016 04:30 AM  
 Chloride 98 03/15/2016 10:10 PM  
 CO2 26 03/16/2016 04:30 AM  
 CO2 27 03/15/2016 10:10 PM  
 Anion gap 9 03/16/2016 04:30 AM  
 Anion gap 9 03/15/2016 10:10 PM  
 Glucose 98 03/16/2016 04:30 AM  
 Glucose 154 (H) 03/15/2016 10:10 PM  
 BUN 8 03/16/2016 04:30 AM  
 BUN 7 03/15/2016 10:10 PM  
 Creatinine 0.53 (L) 03/16/2016 04:30 AM  
 Creatinine 0.68 (L) 03/15/2016 10:10 PM  
 BUN/Creatinine ratio 15 03/16/2016 04:30 AM  
 BUN/Creatinine ratio 10 (L) 03/15/2016 10:10 PM  
 GFR est AA >60 03/16/2016 04:30 AM  
 GFR est AA >60 03/15/2016 10:10 PM  
 GFR est non-AA >60 03/16/2016 04:30 AM  
 GFR est non-AA >60 03/15/2016 10:10 PM  
 Calcium 8.4 (L) 03/16/2016 04:30 AM  
 Calcium 8.3 (L) 03/15/2016 10:10 PM  
 
No results found for: CPK, CPKX, CPX Lab Results Component Value Date/Time Cholesterol, total 120 03/16/2016 04:30 AM  
 HDL Cholesterol 29 03/16/2016 04:30 AM  
 LDL, calculated 68.8 03/16/2016 04:30 AM  
 Triglyceride 111 03/16/2016 04:30 AM  
 CHOL/HDL Ratio 4.1 03/16/2016 04:30 AM  
 
No results found for this or any previous visit. Assessment:  
  
  
Patient Active Problem List  
 Diagnosis Date Noted  CAD (coronary artery disease), native coronary artery 03/16/2016  S/P PTCA (percutaneous transluminal coronary angioplasty) 03/16/2016  Shortness of breath on exertion 03/15/2016  Myocardial ischemia 03/15/2016  DM (diabetes mellitus) ([facility] 02/09/2016  
 HTN (hypertension) 02/09/2016  Dyslipidemia 02/09/2016  GERD (gastroesophageal reflux disease) 02/09/2016  Ventricular bigeminy 02/09/2016  Paroxysmal atrial fibrillation ([facility] 02/09/2016 No CV sx or complaints. No afib he is aware of. Lipids followed by PCP.  Continues on ASA 81 and Xarelto. Plan:  
 
Doing well with no adverse cardiac symptoms. Lipids and labs followed by PCP. Continue current care and f/u in 6 months.  
 
Terrell Frausto MD

## 2019-04-10 RX ORDER — RIVAROXABAN 20 MG/1
TABLET, FILM COATED ORAL
Qty: 90 TAB | Refills: 2 | Status: SHIPPED | OUTPATIENT
Start: 2019-04-10 | End: 2020-01-06

## 2019-04-10 RX ORDER — METOPROLOL SUCCINATE 25 MG/1
TABLET, EXTENDED RELEASE ORAL
Qty: 90 TAB | Refills: 2 | Status: SHIPPED | OUTPATIENT
Start: 2019-04-10 | End: 2020-01-06

## 2019-08-27 ENCOUNTER — OFFICE VISIT (OUTPATIENT)
Dept: CARDIOLOGY CLINIC | Age: 79
End: 2019-08-27

## 2019-08-27 VITALS
DIASTOLIC BLOOD PRESSURE: 70 MMHG | SYSTOLIC BLOOD PRESSURE: 112 MMHG | OXYGEN SATURATION: 98 % | WEIGHT: 167 LBS | HEART RATE: 80 BPM | RESPIRATION RATE: 16 BRPM | BODY MASS INDEX: 23.91 KG/M2 | HEIGHT: 70 IN

## 2019-08-27 DIAGNOSIS — E11.9 TYPE 2 DIABETES MELLITUS WITHOUT COMPLICATION, WITHOUT LONG-TERM CURRENT USE OF INSULIN (HCC): ICD-10-CM

## 2019-08-27 DIAGNOSIS — I49.8 VENTRICULAR BIGEMINY: ICD-10-CM

## 2019-08-27 DIAGNOSIS — I10 ESSENTIAL HYPERTENSION: ICD-10-CM

## 2019-08-27 DIAGNOSIS — I25.10 CORONARY ARTERY DISEASE INVOLVING NATIVE CORONARY ARTERY OF NATIVE HEART WITHOUT ANGINA PECTORIS: Primary | ICD-10-CM

## 2019-08-27 DIAGNOSIS — Z98.61 S/P PTCA (PERCUTANEOUS TRANSLUMINAL CORONARY ANGIOPLASTY): ICD-10-CM

## 2019-08-27 DIAGNOSIS — I48.0 PAROXYSMAL ATRIAL FIBRILLATION (HCC): ICD-10-CM

## 2019-08-27 DIAGNOSIS — E78.5 DYSLIPIDEMIA: ICD-10-CM

## 2019-08-27 RX ORDER — METFORMIN HYDROCHLORIDE 500 MG/1
1000 TABLET, EXTENDED RELEASE ORAL DAILY
COMMUNITY
Start: 2019-08-05

## 2019-08-27 NOTE — PROGRESS NOTES
Verified patient with two patient identifiers. Medications reviewed/approved by Dr. Jakob Marina. A verbal from Dr. Jakob Marina was given to remove any medications that were deleted during the visit. Medication(s) removed:  None    Chief Complaint   Patient presents with    Coronary Artery Disease     6 month follow up    Irregular Heart Beat    Hypertension    Cholesterol Problem     1. Have you been to the ER, urgent care clinic since your last visit? Hospitalized since your last visit?no  2. Have you seen or consulted any other health care providers outside of the [address] [place] [address] since your last visit? Include any pap smears or colon screening. Yes, Goyo Parikh NP seen since last visit for routine care.

## 2019-08-27 NOTE — PROGRESS NOTES
Ivone Francisco is a 78 y.o. male is here for routine f/u. No specific CV sx or complaints. Continues to f/u with [facility]--OV, labs (reviewed). The patient denies chest pain/ shortness of breath, orthopnea, PND, LE edema, palpitations, syncope, presyncope or fatigue.        Patient Active Problem List    Diagnosis Date Noted    CAD (coronary artery disease), native coronary artery 2016    S/P PTCA (percutaneous transluminal coronary angioplasty) 2016    Shortness of breath on exertion 03/15/2016    Myocardial ischemia 03/15/2016    DM (diabetes mellitus) ([facility] 2016    HTN (hypertension) 2016    Dyslipidemia 2016    GERD (gastroesophageal reflux disease) 2016    Ventricular bigeminy 2016    [facility]      Nicola Horvath NP  Past Medical History:   Diagnosis Date    Actinic keratosis     DM [facility]) ([facility]     Dyslipidemia     Erectile dysfunction     GERD (gastroesophageal reflux disease)     Hypertension     Paroxysmal atrial fibrillation (HCC)     Shingles     Ventricular bigeminy       Past Surgical History:   Procedure Laterality Date    HX CHOLECYSTECTOMY       No Known Allergies   Family History   Problem Relation Age of Onset    Cancer Father         lung      Social History     Socioeconomic History    Marital status:      Spouse name: Not on file    Number of children: Not on file    Years of education: Not on file    Highest education level: Not on file   Occupational History    Not on file   Social Needs    Financial resource strain: Not on file    Food insecurity:     Worry: Not on file     Inability: Not on file    Transportation needs:     Medical: Not on file     Non-medical: Not on file   Tobacco Use    Smoking status: Former Smoker     Last attempt to quit: 1987     Years since quittin.6    Smokeless tobacco: Former User     Types: [address] [place] [address] Quit date: 10/31/1990   Substance and Sexual Activity    Alcohol use: No     Alcohol/week: 0.0 standard drinks    Drug use: Not on file    Sexual activity: Not on file   Lifestyle    Physical activity:     Days per week: Not on file     Minutes per session: Not on file    Stress: Not on file   Relationships    Social connections:     Talks on phone: Not on file     Gets together: Not on file     Attends Sikh service: Not on file     Active member of club or organization: Not on file     Attends meetings of clubs or organizations: Not on file     Relationship status: Not on file    Intimate partner violence:     Fear of current or ex partner: Not on file     Emotionally abused: Not on file     Physically abused: Not on file     Forced sexual activity: Not on file   Other Topics Concern    Not on file   Social History Narrative    Not on file      Current Outpatient Medications   Medication Sig    metFORMIN ER (GLUCOPHAGE XR) 500 mg tablet Take 1,000 mg by mouth daily.  metoprolol succinate (TOPROL-XL) 25 mg XL tablet TAKE 1 TABLET DAILY    XARELTO 20 mg tab tablet TAKE 1 TABLET DAILY WITH DINNER    multivitamin (ONE A DAY) tablet Take 1 Tab by mouth daily.  KRILL OIL PO Take  by mouth.  ASCORBATE CALCIUM (VITAMIN C PO) Take  by mouth.  cholecalciferol (VITAMIN D3) 1,000 unit cap Take  by mouth daily.  escitalopram oxalate (LEXAPRO) 20 mg tablet Take 20 mg by mouth daily.  aspirin delayed-release 81 mg tablet Take 1 Tab by mouth daily.  fluticasone (FLONASE) 50 mcg/actuation nasal spray 2 Sprays by Both Nostrils route daily.  pantoprazole (PROTONIX) 40 mg tablet Take 1 Tab by mouth Daily (before breakfast). REPLACES PRILOSEC OR OTHER PPI'S DUE TO POTENTIAL INTERACTION WITH PLAVIX    atorvastatin (LIPITOR) 20 mg tablet Take 1 Tab by mouth daily. TO HELP PREVENT HEART ATTACK AND STROKE (Patient taking differently: Take 10 mg by mouth daily.  TO HELP PREVENT HEART ATTACK AND STROKE)    albuterol (PROVENTIL VENTOLIN) 2.5 mg /3 mL (0.083 %) nebulizer solution by Nebulization route once.  zolpidem (AMBIEN) 5 mg tablet Take  by mouth nightly as needed for Sleep.  potassium chloride (K-DUR, KLOR-CON) 20 mEq tablet Take 20 mEq by mouth daily.  montelukast (SINGULAIR) 10 mg tablet Take 10 mg by mouth daily.  folic acid (FOLVITE) 1 mg tablet Take  by mouth daily.  amlodipine (NORVASC) 5 mg tablet Take 5 mg by mouth daily.  methotrexate (RHEUMATREX) 2.5 mg tablet Take 10 mg by mouth Every Friday. TAKES 4 TABLETS ONCE WEEKLY     No current facility-administered medications for this visit. Review of Symptoms:    CONST  No weight change. No fever, chills, sweats    ENT No visual changes, URI sx, sore throat    CV  See HPI   RESP  No cough, or sputum, wheezing. Also see HPI   GI  No abdominal pain or change in bowel habits. No heartburn or dysphagia. No melena or rectal bleeding.   No dysuria, urgency, frequency, hematuria   MSKEL  No joint pain, swelling. No muscle pain. SKIN  No rash or lesions. NEURO  No headache, syncope, or seizure. No weakness, loss of sensation, or paresthesias. PSYCH  No low mood or depression  No anxiety. HE/LYMPH  No easy bruising, abnormal bleeding, or enlarged glands.         Physical ExamPhysical Exam:    Visit Vitals  /70 (BP 1 Location: Left arm, BP Patient Position: Sitting)   Pulse 80   Resp 16   Ht 5' 10\" (1.778 m)   Wt 167 lb (75.8 kg)   SpO2 98% Comment: ra   BMI 23.96 kg/m²     Gen: NAD  HEENT:  PERRL, throat clear  Neck: no adenopathy, no thyromegaly, no JVD   Heart:  Regular,Nl S1S2,  no murmur, gallop or rub.   Lungs:  clear  Abdomen:   Soft, non-tender, bowel sounds are active.   Extremities:  No edema  Pulse: symmetric  Neuro: A&O times 3, No focal neuro deficits    Cardiographics    ECG: NSR, first degree AV block, no acute changes    Labs:   Lab Results   Component Value Date/Time    Sodium 133 (L) 03/16/2016 04:30 AM    Sodium 134 (L) 03/15/2016 10:10 PM    Potassium 3.9 03/16/2016 04:30 AM    Potassium 4.1 03/15/2016 10:10 PM    Chloride 98 03/16/2016 04:30 AM    Chloride 98 03/15/2016 10:10 PM    CO2 26 03/16/2016 04:30 AM    CO2 27 03/15/2016 10:10 PM    Anion gap 9 03/16/2016 04:30 AM    Anion gap 9 03/15/2016 10:10 PM    Glucose 98 03/16/2016 04:30 AM    Glucose 154 (H) 03/15/2016 10:10 PM    BUN 8 03/16/2016 04:30 AM    BUN 7 03/15/2016 10:10 PM    Creatinine 0.53 (L) 03/16/2016 04:30 AM    Creatinine 0.68 (L) 03/15/2016 10:10 PM    BUN/Creatinine ratio 15 03/16/2016 04:30 AM    BUN/Creatinine ratio 10 (L) 03/15/2016 10:10 PM    GFR est AA >60 03/16/2016 04:30 AM    GFR est AA >60 03/15/2016 10:10 PM    GFR est non-AA >60 03/16/2016 04:30 AM    GFR est non-AA >60 03/15/2016 10:10 PM    Calcium 8.4 (L) 03/16/2016 04:30 AM    Calcium 8.3 (L) 03/15/2016 10:10 PM     No results found for: CPK, CPKX, CPX  Lab Results   Component Value Date/Time    Cholesterol, total 120 03/16/2016 04:30 AM    HDL Cholesterol 29 03/16/2016 04:30 AM    LDL, calculated 68.8 03/16/2016 04:30 AM    Triglyceride 111 03/16/2016 04:30 AM    CHOL/HDL Ratio 4.1 03/16/2016 04:30 AM     No results found for this or any previous visit. Assessment:         Patient Active Problem List    Diagnosis Date Noted    CAD (coronary artery disease), native coronary artery 03/16/2016    S/P PTCA (percutaneous transluminal coronary angioplasty) 03/16/2016    Shortness of breath on exertion 03/15/2016    Myocardial ischemia 03/15/2016    DM (di[facility] ([facility] 02/09/2016    HTN (hypertension) 02/09/2016    Dyslipidemia 02/09/2016    GERD (gastroesophageal reflux disease) 02/09/2016    Ventricular bigeminy 02/09/2016    Paroxysmal atrial fibrillation ([facility] 02/09/2016     No specific CV sx or complaints. Continues to f/u with [facility]--OV, labs (reviewed). Plan:     Doing well with no adverse cardiac symptoms.   Lipids and labs followed by PCP. Continue current care and f/u in 6 months.     Adenike Cid MD

## 2019-08-27 NOTE — LETTER
8/27/19 Patient: Coral Peacock YOB: 1940 Date of Visit: 8/27/2019 Brooke Menezes NP 
[address] [place] [id number] VIA Facsimile: [phone] Dear Brooke Menezes NP, Thank you for referring Mr. Magdalena Chanel to [address] [place] [address] for evaluation. My notes for this consultation are attached. If you have questions, please do not hesitate to call me. I look forward to following your patient along with you.  
 
 
Sincerely, 
 
Evangelina Cooley MD

## 2020-02-28 ENCOUNTER — OFFICE VISIT (OUTPATIENT)
Dept: CARDIOLOGY CLINIC | Age: 80
End: 2020-02-28

## 2020-02-28 VITALS
RESPIRATION RATE: 14 BRPM | WEIGHT: 177 LBS | BODY MASS INDEX: 25.34 KG/M2 | HEIGHT: 70 IN | HEART RATE: 83 BPM | DIASTOLIC BLOOD PRESSURE: 64 MMHG | OXYGEN SATURATION: 95 % | SYSTOLIC BLOOD PRESSURE: 118 MMHG

## 2020-02-28 DIAGNOSIS — E11.9 TYPE 2 DIABETES MELLITUS WITHOUT COMPLICATION, WITHOUT LONG-TERM CURRENT USE OF INSULIN (HCC): ICD-10-CM

## 2020-02-28 DIAGNOSIS — E78.5 DYSLIPIDEMIA: ICD-10-CM

## 2020-02-28 DIAGNOSIS — I48.0 PAROXYSMAL ATRIAL FIBRILLATION (HCC): ICD-10-CM

## 2020-02-28 DIAGNOSIS — I25.10 CORONARY ARTERY DISEASE INVOLVING NATIVE CORONARY ARTERY OF NATIVE HEART WITHOUT ANGINA PECTORIS: Primary | ICD-10-CM

## 2020-02-28 DIAGNOSIS — I10 ESSENTIAL HYPERTENSION: ICD-10-CM

## 2020-02-28 DIAGNOSIS — Z98.61 S/P PTCA (PERCUTANEOUS TRANSLUMINAL CORONARY ANGIOPLASTY): ICD-10-CM

## 2020-02-28 NOTE — PROGRESS NOTES
Fabio Roe is a 78 y.o. male is here for routine f/u. Hx ASCVD, CAD, s/p PCI/ANUSHA 2016, DM II, hypertension, dyslipidemia. No specific CV sx or complaints. Continues to f/u with [facility]--OV, labs (reviewed). The patient denies chest pain/ shortness of breath, orthopnea, PND, LE edema, palpitations, syncope, presyncope or fatigue.        Patient Active Problem List    Diagnosis Date Noted    CAD (coronary artery disease), native coronary artery 03/16/2016    S/P PTCA (percutaneous transluminal coronary angioplasty) 03/16/2016    Shortness of breath on exertion 03/15/2016    Myocardial ischemia 03/15/2016    DM (diabetes mellitus) ([facility] 02/09/2016    HTN (hypertension) 02/09/2016    Dyslipidemia 02/09/2016    GERD (gastroesophageal reflux disease) 02/09/2016    Ventricular bigeminy 02/09/2016    Paroxysmal atrial fibrillation ([facility] 02/09/2016      Ligia Hurtado NP  Past Medical History:   Diagnosis Date    Actinic keratosis     DM [facility]diabetes mellitus) ([facility]     Dyslipidemia     Erectile dysfunction     GERD (gastroesophageal reflux disease)     Hypertension     Paroxysmal atrial fibrillation (HCC)     Shingles     Ventricular bigeminy       Past Surgical History:   Procedure Laterality Date    HX CHOLECYSTECTOMY  1995     No Known Allergies   Family History   Problem Relation Age of Onset    Cancer Father         lung      Social History     Socioeconomic History    Marital status:      Spouse name: Not on file    Number of children: Not on file    Years of education: Not on file    Highest education level: Not on file   Occupational History    Not on file   Social Needs    Financial resource strain: Not on file    Food insecurity:     Worry: Not on file     Inability: Not on file    Transportation needs:     Medical: Not on file     Non-medical: Not on file   Tobacco Use    Smoking status: Former Smoker     Last attempt to quit: 1/1/1987     Years since quittin.1    Smokeless tobacco: Former User     Types: Chew     Quit date: 10/31/1990   Substance and Sexual Activity    Alcohol use: No     Alcohol/week: 0.0 standard drinks    Drug use: Not on file    Sexual activity: Not on file   Lifestyle    Physical activity:     Days per week: Not on file     Minutes per session: Not on file    Stress: Not on file   Relationships    Social connections:     Talks on phone: Not on file     Gets together: Not on file     Attends [employer] service: Not on file     Active member of club or organization: Not on file     Attends meetings of clubs or organizations: Not on file     Relationship status: Not on file    Intimate partner violence:     Fear of current or ex partner: Not on file     Emotionally abused: Not on file     Physically abused: Not on file     Forced sexual activity: Not on file   Other Topics Concern    Not on file   Social History Narrative    Not on file      Current Outpatient Medications   Medication Sig    TOPROL XL 25 mg XL tablet TAKE 1 TABLET DAILY    XARELTO 20 mg tab tablet TAKE 1 TABLET DAILY WITH DINNER    metFORMIN ER (GLUCOPHAGE XR) 500 mg tablet Take 1,000 mg by mouth daily.  multivitamin (ONE A DAY) tablet Take 1 Tab by mouth daily.  KRILL OIL PO Take  by mouth.  ASCORBATE CALCIUM (VITAMIN C PO) Take  by mouth.  cholecalciferol (VITAMIN D3) 1,000 unit cap Take  by mouth daily.  escitalopram oxalate (LEXAPRO) 20 mg tablet Take 20 mg by mouth daily.  aspirin delayed-release 81 mg tablet Take 1 Tab by mouth daily.  fluticasone (FLONASE) 50 mcg/actuation nasal spray 2 Sprays by Both Nostrils route daily.  pantoprazole (PROTONIX) 40 mg tablet Take 1 Tab by mouth Daily (before breakfast). REPLACES PRILOSEC OR OTHER PPI'S DUE TO POTENTIAL INTERACTION WITH PLAVIX    atorvastatin (LIPITOR) 20 mg tablet Take 1 Tab by mouth daily.  TO HELP PREVENT HEART ATTACK AND STROKE    albuterol (PROVENTIL VENTOLIN) 2.5 mg /3 mL (0.083 %) nebulizer solution by Nebulization route once.  zolpidem (AMBIEN) 5 mg tablet Take  by mouth nightly as needed for Sleep.  potassium chloride (K-DUR, KLOR-CON) 20 mEq tablet Take 20 mEq by mouth daily.  montelukast (SINGULAIR) 10 mg tablet Take 10 mg by mouth daily.  folic acid (FOLVITE) 1 mg tablet Take  by mouth daily.  amlodipine (NORVASC) 5 mg tablet Take 5 mg by mouth daily.  methotrexate (RHEUMATREX) 2.5 mg tablet Take 10 mg by mouth Every Friday. TAKES 4 TABLETS ONCE WEEKLY     No current facility-administered medications for this visit. Review of Symptoms:    CONST  No weight change. No fever, chills, sweats    ENT No visual changes, URI sx, sore throat    CV  See HPI   RESP  No cough, or sputum, wheezing. Also see HPI   GI  No abdominal pain or change in bowel habits. No heartburn or dysphagia. No melena or rectal bleeding.   No dysuria, urgency, frequency, hematuria   MSKEL  No joint pain, swelling. No muscle pain. SKIN  No rash or lesions. NEURO  No headache, syncope, or seizure. No weakness, loss of sensation, or paresthesias. PSYCH  No low mood or depression  No anxiety. HE/LYMPH  No easy bruising, abnormal bleeding, or enlarged glands.         Physical ExamPhysical Exam:    Visit Vitals  /64 (BP 1 Location: Right arm, BP Patient Position: Sitting)   Pulse 83   Resp 14   Ht 5' 10\" (1.778 m)   Wt 177 lb (80.3 kg)   SpO2 95%   BMI 25.40 kg/m²     Gen: NAD  HEENT:  PERRL, throat clear  Neck: no adenopathy, no thyromegaly, no JVD   Heart:  Regular,Nl S1S2,  no murmur, gallop or rub.   Lungs:  clear  Abdomen:   Soft, non-tender, bowel sounds are active.   Extremities:  No edema  Pulse: symmetric  Neuro: A&O times 3, No focal neuro deficits    Cardiographics    ECG: NSR, first degree AV block, no change from previous      Labs:   Lab Results   Component Value Date/Time    Sodium 133 (L) 03/16/2016 04:30 AM    Sodium 134 (L) 03/15/2016 10:10 PM Potassium 3.9 03/16/2016 04:30 AM    Potassium 4.1 03/15/2016 10:10 PM    Chloride 98 03/16/2016 04:30 AM    Chloride 98 03/15/2016 10:10 PM    CO2 26 03/16/2016 04:30 AM    CO2 27 03/15/2016 10:10 PM    Anion gap 9 03/16/2016 04:30 AM    Anion gap 9 03/15/2016 10:10 PM    Glucose 98 03/16/2016 04:30 AM    Glucose 154 (H) 03/15/2016 10:10 PM    BUN 8 03/16/2016 04:30 AM    BUN 7 03/15/2016 10:10 PM    Creatinine 0.53 (L) 03/16/2016 04:30 AM    Creatinine 0.68 (L) 03/15/2016 10:10 PM    BUN/Creatinine ratio 15 03/16/2016 04:30 AM    BUN/Creatinine ratio 10 (L) 03/15/2016 10:10 PM    GFR est AA >60 03/16/2016 04:30 AM    GFR est AA >60 03/15/2016 10:10 PM    GFR est non-AA >60 03/16/2016 04:30 AM    GFR est non-AA >60 03/15/2016 10:10 PM    Calcium 8.4 (L) 03/16/2016 04:30 AM    Calcium 8.3 (L) 03/15/2016 10:10 PM     No results found for: CPK, CPKX, CPX  Lab Results   Component Value Date/Time    Cholesterol, total 120 03/16/2016 04:30 AM    HDL Cholesterol 29 03/16/2016 04:30 AM    LDL, calculated 68.8 03/16/2016 04:30 AM    Triglyceride 111 03/16/2016 04:30 AM    CHOL/HDL Ratio 4.1 03/16/2016 04:30 AM     No results found for this or any previous visit. Assessment:         Patient Active Problem List    Diagnosis Date Noted    CAD (coronary artery disease), native coronary artery 03/16/2016    S/P PTCA (percutaneous transluminal coronary angioplasty) 03/16/2016    Shortness of breath on exertion 03/15/2016    Myocardial ischemia 03/15/2016    DM (diabetes mellitus) ([facility] 02/09/2016    HTN (hypertension) 02/09/2016    Dyslipidemia 02/09/2016    GERD (gastroesophageal reflux disease) 02/09/2016    Ventricular bigeminy 02/09/2016    Paroxysmal atrial fibrillation ([facility] 02/09/2016      No specific CV sx or complaints. Continues to f/u with [facility]--OV, labs (reviewed). Plan:     Doing well with no adverse cardiac symptoms. Lipids and labs followed by PCP.   Continue current care and f/u in 6 months.     Dustin Robles MD

## 2020-02-28 NOTE — PROGRESS NOTES
PATIENT ID VERIFIED WITH TWO PATIENT IDENTIFIERS. PATIENT MEDICATIONS REVIEWED AND APPROVED BY DR. Jesu Melo. Chief Complaint   Patient presents with    Coronary Artery Disease     6 mo f/u    Irregular Heart Beat    Hypertension    Cholesterol Problem       1. Have you been to the ER, urgent care clinic since your last visit? Hospitalized since your last visit?no    2. Have you seen or consulted any other health care providers outside of the [address] [place] [address] since your last visit? Include any pap smears or colon screening.  Yes PCP Sue Fernández 3 mo ago for routine f/u

## 2020-07-06 RX ORDER — METOPROLOL SUCCINATE 25 MG/1
TABLET, EXTENDED RELEASE ORAL
Qty: 90 TAB | Refills: 0 | Status: SHIPPED | OUTPATIENT
Start: 2020-07-06 | End: 2020-12-02 | Stop reason: SDUPTHER

## 2020-07-06 RX ORDER — RIVAROXABAN 20 MG/1
TABLET, FILM COATED ORAL
Qty: 90 TAB | Refills: 0 | Status: SHIPPED | OUTPATIENT
Start: 2020-07-06 | End: 2020-12-02 | Stop reason: SDUPTHER

## 2020-12-02 ENCOUNTER — OFFICE VISIT (OUTPATIENT)
Dept: CARDIOLOGY CLINIC | Age: 80
End: 2020-12-02
Payer: MEDICARE

## 2020-12-02 VITALS
WEIGHT: 182 LBS | TEMPERATURE: 96.2 F | SYSTOLIC BLOOD PRESSURE: 142 MMHG | DIASTOLIC BLOOD PRESSURE: 80 MMHG | HEART RATE: 60 BPM | HEIGHT: 70 IN | OXYGEN SATURATION: 96 % | RESPIRATION RATE: 16 BRPM | BODY MASS INDEX: 26.05 KG/M2

## 2020-12-02 DIAGNOSIS — K21.9 GASTROESOPHAGEAL REFLUX DISEASE WITHOUT ESOPHAGITIS: ICD-10-CM

## 2020-12-02 DIAGNOSIS — I25.10 CORONARY ARTERY DISEASE INVOLVING NATIVE CORONARY ARTERY OF NATIVE HEART WITHOUT ANGINA PECTORIS: Primary | ICD-10-CM

## 2020-12-02 DIAGNOSIS — I49.8 VENTRICULAR BIGEMINY: ICD-10-CM

## 2020-12-02 DIAGNOSIS — I48.0 PAROXYSMAL ATRIAL FIBRILLATION (HCC): ICD-10-CM

## 2020-12-02 DIAGNOSIS — Z98.61 S/P PTCA (PERCUTANEOUS TRANSLUMINAL CORONARY ANGIOPLASTY): ICD-10-CM

## 2020-12-02 DIAGNOSIS — E78.5 DYSLIPIDEMIA: ICD-10-CM

## 2020-12-02 DIAGNOSIS — I10 ESSENTIAL HYPERTENSION: ICD-10-CM

## 2020-12-02 DIAGNOSIS — E11.9 TYPE 2 DIABETES MELLITUS WITHOUT COMPLICATION, WITHOUT LONG-TERM CURRENT USE OF INSULIN (HCC): ICD-10-CM

## 2020-12-02 PROCEDURE — G8536 NO DOC ELDER MAL SCRN: HCPCS | Performed by: INTERNAL MEDICINE

## 2020-12-02 PROCEDURE — 99214 OFFICE O/P EST MOD 30 MIN: CPT | Performed by: INTERNAL MEDICINE

## 2020-12-02 PROCEDURE — G8432 DEP SCR NOT DOC, RNG: HCPCS | Performed by: INTERNAL MEDICINE

## 2020-12-02 PROCEDURE — G8754 DIAS BP LESS 90: HCPCS | Performed by: INTERNAL MEDICINE

## 2020-12-02 PROCEDURE — G8419 CALC BMI OUT NRM PARAM NOF/U: HCPCS | Performed by: INTERNAL MEDICINE

## 2020-12-02 PROCEDURE — 1101F PT FALLS ASSESS-DOCD LE1/YR: CPT | Performed by: INTERNAL MEDICINE

## 2020-12-02 PROCEDURE — G8753 SYS BP > OR = 140: HCPCS | Performed by: INTERNAL MEDICINE

## 2020-12-02 PROCEDURE — G8427 DOCREV CUR MEDS BY ELIG CLIN: HCPCS | Performed by: INTERNAL MEDICINE

## 2020-12-02 PROCEDURE — 93000 ELECTROCARDIOGRAM COMPLETE: CPT | Performed by: INTERNAL MEDICINE

## 2020-12-02 RX ORDER — METOPROLOL SUCCINATE 25 MG/1
TABLET, EXTENDED RELEASE ORAL
Qty: 90 TAB | Refills: 3 | Status: SHIPPED | OUTPATIENT
Start: 2020-12-02 | End: 2021-04-20

## 2020-12-02 NOTE — PROGRESS NOTES
Verified patient with two patient identifiers. Medications reviewed/approved by Dr. Roberto Carpenter. Chief Complaint   Patient presents with    Coronary Artery Disease     6 month follow up    Irregular Heart Beat    Cholesterol Problem     1. Have you been to the ER, urgent care clinic since your last visit? Hospitalized since your last visit?no    2. Have you seen or consulted any other health care providers outside of the [address] [place] [address] since your last visit? Include any pap smears or colon screening. Yes, Dolly Tang NP seen last week for r/c.

## 2020-12-02 NOTE — LETTER
12/2/20 Patient: Yann Sosa YOB: 1940 Date of Visit: 12/2/2020 Garrett Marcano NP 
[address] [facility] [id number] VIA Facsimile: [phone] Dear Garrett Marcano NP, Thank you for referring Mr. Blayne Wesley to [facility] for evaluation. My notes for this consultation are attached. If you have questions, please do not hesitate to call me. I look forward to following your patient along with you.  
 
 
Sincerely, 
 
Michael Patten MD

## 2020-12-02 NOTE — PROGRESS NOTES
Terra Ramos is a [de-identified] y.o. male is here for routine f/u.  Hx ASCVD, CAD, s/p PCI/ANUSHA 2016, DM II, hypertension, PAF (on [address]), dyslipidemia. No specific CV sx or complaints.  Continues to f/u with [facility]--OV, labs (reviewed). Labs from 11/25/20 with normal CBC, CMP with glc 241, Cr 0.7, K 4.6, chol 96, LDL 28, HDL 36, , HbA1c 7.5. The patient denies chest pain/ shortness of breath, orthopnea, PND, LE edema, palpitations, syncope, presyncope or fatigue.        Patient Active Problem List    Diagnosis Date Noted    CAD (coronary artery disease), native coronary artery 03/16/2016    S/P PTCA (percutaneous transluminal coronary angioplasty) 03/16/2016    Shortness of breath on exertion 03/15/2016    Myocardial ischemia 03/15/2016    DM (diabetes mellitus) ([facility] 02/09/2016    HTN (hypertension) 02/09/2016    Dyslipidemia 02/09/2016    GERD (gastroesophageal reflux disease) 02/09/2016    Ventricular bigeminy 02/09/2016    Paroxysmal atrial fibrillation ([facility] 02/09/2016      Hyun Rodriguez NP  Past Medical History:   Diagnosis Date    Actinic keratosis     DM (diabetes mellitus) ([facility]     Dyslipidemia     Erectile dysfunction     GERD (gastroesophageal reflux disease)     Hypertension     Paroxysmal atrial fibrillation (HCC)     Shingles     Ventricular bigeminy       Past Surgical History:   Procedure Laterality Date    HX CHOLECYSTECTOMY  1995     No Known Allergies   Family History   Problem Relation Age of Onset    Cancer Father         lung      Social History     Socioeconomic History    Marital status:      Spouse name: Not on file    Number of children: Not on file    Years of education: Not on file    Highest education level: Not on file   Occupational History    Not on file   Social Needs    Financial resource strain: Not on file    Food insecurity     Worry: Not on file     Inability: Not on file    Transportation needs     Medical: Not on file Non-medical: Not on file   Tobacco Use    Smoking status: Former Smoker     Types: Pipe     Last attempt to quit: 1987     Years since quittin.9    Smokeless tobacco: Former User     Types: [address] date: 10/31/1990    Tobacco comment: smoked a pipe longer than cigs. Substance and Sexual Activity    Alcohol use: No     Alcohol/week: 0.0 standard drinks    Drug use: Not on file    Sexual activity: Not on file   Lifestyle    Physical activity     Days per week: Not on file     Minutes per session: Not on file    Stress: Not on file   Relationships    Social connections     Talks on phone: Not on file     Gets together: Not on file     Attends [employer] service: Not on file     Active member of club or organization: Not on file     Attends meetings of clubs or organizations: Not on file     Relationship status: Not on file    Intimate partner violence     Fear of current or ex partner: Not on file     Emotionally abused: Not on file     Physically abused: Not on file     Forced sexual activity: Not on file   Other Topics Concern    Not on file   Social History Narrative    Not on file      Current Outpatient Medications   Medication Sig    Xarelto 20 mg tab tablet TAKE 1 TABLET DAILY WITH DINNER    Toprol XL 25 mg XL tablet TAKE 1 TABLET DAILY    metFORMIN ER (GLUCOPHAGE XR) 500 mg tablet Take 1,000 mg by mouth daily.  multivitamin (ONE A DAY) tablet Take 1 Tab by mouth daily.  KRILL OIL PO Take  by mouth.  ASCORBATE CALCIUM (VITAMIN C PO) Take  by mouth.  cholecalciferol (VITAMIN D3) 1,000 unit cap Take  by mouth daily.  escitalopram oxalate (LEXAPRO) 20 mg tablet Take 20 mg by mouth daily.  aspirin delayed-release 81 mg tablet Take 1 Tab by mouth daily.  fluticasone (FLONASE) 50 mcg/actuation nasal spray 2 Sprays by Both Nostrils route daily.  pantoprazole (PROTONIX) 40 mg tablet Take 1 Tab by mouth Daily (before breakfast).  REPLACES PRILOSEC OR OTHER PPI'S DUE TO POTENTIAL INTERACTION WITH PLAVIX    atorvastatin (LIPITOR) 20 mg tablet Take 1 Tab by mouth daily. TO HELP PREVENT HEART ATTACK AND STROKE    albuterol (PROVENTIL VENTOLIN) 2.5 mg /3 mL (0.083 %) nebulizer solution by Nebulization route once.  zolpidem (AMBIEN) 5 mg tablet Take  by mouth nightly as needed for Sleep.  potassium chloride (K-DUR, KLOR-CON) 20 mEq tablet Take 20 mEq by mouth daily.  montelukast (SINGULAIR) 10 mg tablet Take 10 mg by mouth daily.  folic acid (FOLVITE) 1 mg tablet Take  by mouth daily.  amlodipine (NORVASC) 5 mg tablet Take 5 mg by mouth daily.  methotrexate (RHEUMATREX) 2.5 mg tablet Take 10 mg by mouth Every Friday. TAKES 4 TABLETS ONCE WEEKLY     No current facility-administered medications for this visit. Review of Symptoms:    CONST  No weight change. No fever, chills, sweats    ENT No visual changes, URI sx, sore throat    CV  See HPI   RESP  No cough, or sputum, wheezing. Also see HPI   GI  No abdominal pain or change in bowel habits. No heartburn or dysphagia. No melena or rectal bleeding.   No dysuria, urgency, frequency, hematuria   MSKEL  No joint pain, swelling. No muscle pain. SKIN  No rash or lesions. NEURO  No headache, syncope, or seizure. No weakness, loss of sensation, or paresthesias. PSYCH  No low mood or depression  No anxiety. HE/LYMPH  No easy bruising, abnormal bleeding, or enlarged glands. Physical ExamPhysical Exam:    Visit Vitals  BP (!) 142/80 (BP 1 Location: Left arm, BP Patient Position: Sitting)   Pulse 60   Temp (!) 96.2 °F (35.7 °C) (Temporal)   Resp 16   Ht 5' 10\" (1.778 m)   Wt 182 lb (82.6 kg)   SpO2 96% Comment: ra   BMI 26.11 kg/m²     Gen: NAD  HEENT:  PERRL, throat clear  Neck: no adenopathy, no thyromegaly, no JVD   Heart:  Regular,Nl S1S2,  no murmur, gallop or rub. Lungs:  clear  Abdomen:   Soft, non-tender, bowel sounds are active.    Extremities:  No edema  Pulse: symmetric  Neuro: A&O times 3, No focal neuro deficits    Cardiographics    ECG: NSR, first degree AV block, right axis      Labs:   Lab Results   Component Value Date/Time    Sodium 133 (L) 03/16/2016 04:30 AM    Sodium 134 (L) 03/15/2016 10:10 PM    Potassium 3.9 03/16/2016 04:30 AM    Potassium 4.1 03/15/2016 10:10 PM    Chloride 98 03/16/2016 04:30 AM    Chloride 98 03/15/2016 10:10 PM    CO2 26 03/16/2016 04:30 AM    CO2 27 03/15/2016 10:10 PM    Anion gap 9 03/16/2016 04:30 AM    Anion gap 9 03/15/2016 10:10 PM    Glucose 98 03/16/2016 04:30 AM    Glucose 154 (H) 03/15/2016 10:10 PM    BUN 8 03/16/2016 04:30 AM    BUN 7 03/15/2016 10:10 PM    Creatinine 0.53 (L) 03/16/2016 04:30 AM    Creatinine 0.68 (L) 03/15/2016 10:10 PM    BUN/Creatinine ratio 15 03/16/2016 04:30 AM    BUN/Creatinine ratio 10 (L) 03/15/2016 10:10 PM    GFR est AA >60 03/16/2016 04:30 AM    GFR est AA >60 03/15/2016 10:10 PM    GFR est non-AA >60 03/16/2016 04:30 AM    GFR est non-AA >60 03/15/2016 10:10 PM    Calcium 8.4 (L) 03/16/2016 04:30 AM    Calcium 8.3 (L) 03/15/2016 10:10 PM     No results found for: CPK, CPKX, CPX  Lab Results   Component Value Date/Time    Cholesterol, total 120 03/16/2016 04:30 AM    HDL Cholesterol 29 03/16/2016 04:30 AM    LDL, calculated 68.8 03/16/2016 04:30 AM    Triglyceride 111 03/16/2016 04:30 AM    CHOL/HDL Ratio 4.1 03/16/2016 04:30 AM     No results found for this or any previous visit.     Assessment:         Patient Active Problem List    Diagnosis Date Noted    CAD (coronary artery disease), native coronary artery 03/16/2016    S/P PTCA (percutaneous transluminal coronary angioplasty) 03/16/2016    Shortness of breath on exertion 03/15/2016    Myocardial ischemia 03/15/2016    [facility]    HTN (hypertension) 02/09/2016    Dyslipidemia 02/09/2016    GERD (gastroesophageal reflux disease) 02/09/2016    Ventricular bigeminy 02/09/2016    Paroxysmal atrial fibrillation ([facility] 02/09/2016      Hx ASCVD, CAD, s/p PCI/ANUSHA 2016, DM II, hypertension, PAF (in NSR on [address] [place] [address] Road),  dyslipidemia. No specific CV sx or complaints.  Continues to f/u with [facility]--OV, labs (reviewed). Labs from 11/25/20 with normal CBC, CMP with glc 241, Cr 0.7, K 4.6, chol 96, LDL 28, HDL 36, , HbA1c 7.5. Plan:     Doing well with no adverse cardiac symptoms. DM, Lipids and labs followed by PCP. PAF hx, in NSR on [address] [place] [address]   Continue current care and f/u in 12 months (his preference), sooner prn.     Karen Darden MD

## 2021-11-12 NOTE — TELEPHONE ENCOUNTER
PCP: Jett Oh NP    Last appt: 12/2/2020  Future Appointments   Date Time Provider Hedy Annai   12/3/2021  2:00 PM Fernando Mc MD [facility] AMB       Requested Prescriptions     Pending Prescriptions Disp Refills    rivaroxaban (Xarelto) 20 mg tab tablet [Pharmacy Med Name: [facility] 20MG] 90 Tablet 0     Sig: TAKE 1 TABLET DAILY WITH DINNER

## 2021-12-03 ENCOUNTER — OFFICE VISIT (OUTPATIENT)
Dept: CARDIOLOGY CLINIC | Age: 81
End: 2021-12-03
Payer: MEDICARE

## 2021-12-03 VITALS
BODY MASS INDEX: 23.91 KG/M2 | RESPIRATION RATE: 18 BRPM | WEIGHT: 167 LBS | HEIGHT: 70 IN | HEART RATE: 94 BPM | TEMPERATURE: 98.7 F | OXYGEN SATURATION: 99 % | DIASTOLIC BLOOD PRESSURE: 70 MMHG | SYSTOLIC BLOOD PRESSURE: 130 MMHG

## 2021-12-03 DIAGNOSIS — I48.0 PAROXYSMAL ATRIAL FIBRILLATION (HCC): ICD-10-CM

## 2021-12-03 DIAGNOSIS — I25.10 CORONARY ARTERY DISEASE INVOLVING NATIVE CORONARY ARTERY OF NATIVE HEART WITHOUT ANGINA PECTORIS: Primary | ICD-10-CM

## 2021-12-03 DIAGNOSIS — E78.5 DYSLIPIDEMIA: ICD-10-CM

## 2021-12-03 DIAGNOSIS — I10 ESSENTIAL HYPERTENSION: ICD-10-CM

## 2021-12-03 DIAGNOSIS — Z98.61 S/P PTCA (PERCUTANEOUS TRANSLUMINAL CORONARY ANGIOPLASTY): ICD-10-CM

## 2021-12-03 DIAGNOSIS — E11.9 TYPE 2 DIABETES MELLITUS WITHOUT COMPLICATION, WITHOUT LONG-TERM CURRENT USE OF INSULIN (HCC): ICD-10-CM

## 2021-12-03 PROCEDURE — G8754 DIAS BP LESS 90: HCPCS | Performed by: INTERNAL MEDICINE

## 2021-12-03 PROCEDURE — G8427 DOCREV CUR MEDS BY ELIG CLIN: HCPCS | Performed by: INTERNAL MEDICINE

## 2021-12-03 PROCEDURE — G8536 NO DOC ELDER MAL SCRN: HCPCS | Performed by: INTERNAL MEDICINE

## 2021-12-03 PROCEDURE — 1101F PT FALLS ASSESS-DOCD LE1/YR: CPT | Performed by: INTERNAL MEDICINE

## 2021-12-03 PROCEDURE — G8510 SCR DEP NEG, NO PLAN REQD: HCPCS | Performed by: INTERNAL MEDICINE

## 2021-12-03 PROCEDURE — G8420 CALC BMI NORM PARAMETERS: HCPCS | Performed by: INTERNAL MEDICINE

## 2021-12-03 PROCEDURE — 93000 ELECTROCARDIOGRAM COMPLETE: CPT | Performed by: INTERNAL MEDICINE

## 2021-12-03 PROCEDURE — 99214 OFFICE O/P EST MOD 30 MIN: CPT | Performed by: INTERNAL MEDICINE

## 2021-12-03 PROCEDURE — G8752 SYS BP LESS 140: HCPCS | Performed by: INTERNAL MEDICINE

## 2021-12-03 NOTE — PROGRESS NOTES
Identified pt with two pt identifiers(name and ). Reviewed record in preparation for visit and have obtained necessary documentation. Chief Complaint   Patient presents with    Coronary Artery Disease     6 month follow up    Irregular Heart Beat    Hypertension    Cholesterol Problem      Vitals:    21 1359   BP: 130/70   Pulse: 94   Resp: 18   Temp: 98.7 °F (37.1 °C)   TempSrc: Temporal   SpO2: 99%   Weight: 167 lb (75.8 kg)   Height: 5' 10\" (1.778 m)   PainSc:   0 - No pain       Medications reviewed/approved by Dr. Eneida Lee. Health Maintenance Review: Patient reminded of \"due or due soon\" health maintenance. I have asked the patient to contact his/her primary care provider (PCP) for follow-up on his/her health maintenance. Coordination of Care Questionnaire:  :   1) Have you been to an emergency room, urgent care, or hospitalized since your last visit? If yes, where when, and reason for visit? no       2. Have seen or consulted any other health care provider since your last visit? If yes, where when, and reason for visit? Tl Hairston NP 21. Patient is accompanied by self I have received verbal consent from [facility] to discuss any/all medical information while they are present in the room.

## 2021-12-03 NOTE — PROGRESS NOTES
Tarik Hernandez is a 80 y.o. male is here for routine f/u.  Hx ASCVD, CAD, s/p PCI/ANUSHA , DM II, hypertension, PAF (in [facility]R on [address]),  dyslipidemia. No specific CV sx or complaints.  Continues to f/u with [facility]--OV, labs (reviewed). from 21--CBC normal, glc 200, BUN10, Cr  0.8, K 4.4, HbA1c 6.2. The patient denies chest pain/ shortness of breath, orthopnea, PND, LE edema, palpitations, syncope, presyncope or fatigue.        Patient Active Problem List    Diagnosis Date Noted    CAD (coronary artery disease), native coronary artery 2016    S/P PTCA (percutaneous transluminal coronary angioplasty) 2016    Shortness of breath on exertion 03/15/2016    Myocardial ischemia 03/15/2016    DM [facility]diabetes mellitus) ([facility] 2016    HTN (hypertension) 2016    Dyslipidemia 2016    GERD (gastroesophageal reflux disease) 2016    Ventricular bigeminy 2016    Paroxysmal atrial fibrillation ([facility] 2016      Tigist Hale NP  Past Medical History:   Diagnosis Date    Actinic keratosis     DM [facility]diabetes mellitus) ([facility]     Dyslipidemia     Erectile dysfunction     GERD (gastroesophageal reflux disease)     Hypertension     Paroxysmal atrial fibrillation (HCC)     Shingles     Ventricular bigeminy       Past Surgical History:   Procedure Laterality Date    HX CHOLECYSTECTOMY       No Known Allergies   Family History   Problem Relation Age of Onset    Cancer Father         lung      Social History     Socioeconomic History    Marital status:      Spouse name: Not on file    Number of children: Not on file    Years of education: Not on file    Highest education level: Not on file   Occupational History    Not on file   Tobacco Use    Smoking status: Former Smoker     Years: 30.00     Types: Pipe     Quit date: 1987     Years since quittin.9    Smokeless tobacco: Former User     Types: Chew     Quit date: 10/31/1990    Tobacco comment: smoked a pipe longer than cigs. Substance and Sexual Activity    Alcohol use: No     Alcohol/week: 0.0 standard drinks    Drug use: Not on file    Sexual activity: Not on file   Other Topics Concern    Not on file   Social History Narrative    Not on file     Social Determinants of Health     Financial Resource Strain:     Difficulty of Paying Living Expenses: Not on file   Food Insecurity:     Worried About Running Out of Food in the Last Year: Not on file    Melia of Food in the Last Year: Not on file   Transportation Needs:     Lack of Transportation (Medical): Not on file    Lack of Transportation (Non-Medical): Not on file   Physical Activity:     Days of Exercise per Week: Not on file    Minutes of Exercise per Session: Not on file   Stress:     Feeling of Stress : Not on file   Social Connections:     Frequency of Communication with Friends and Family: Not on file    Frequency of Social Gatherings with Friends and Family: Not on file    Attends Anabaptism Services: Not on file    Active Member of [address] [place] [address] or Organizations: Not on file    Attends Club or Organization Meetings: Not on file    Marital Status: Not on file   Intimate Partner Violence:     Fear of Current or Ex-Partner: Not on file    Emotionally Abused: Not on file    Physically Abused: Not on file    Sexually Abused: Not on file   Housing Stability:     Unable to Pay for Housing in the Last Year: Not on file    Number of [place] in the Last Year: Not on file    Unstable Housing in the Last Year: Not on file      Current Outpatient Medications   Medication Sig    rivaroxaban (Xarelto) 20 mg tab tablet TAKE 1 TABLET DAILY WITH DINNER    metoprolol succinate (TOPROL-XL) 25 mg XL tablet TAKE 1 TABLET DAILY    metFORMIN ER (GLUCOPHAGE XR) 500 mg tablet Take 1,000 mg by mouth daily.  multivitamin (ONE A DAY) tablet Take 1 Tab by mouth daily.  KRILL OIL PO Take  by mouth.     ASCORBATE CALCIUM (VITAMIN C PO) Take  by mouth.  cholecalciferol (VITAMIN D3) 1,000 unit cap Take  by mouth daily.  escitalopram oxalate (LEXAPRO) 20 mg tablet Take 20 mg by mouth daily.  aspirin delayed-release 81 mg tablet Take 1 Tab by mouth daily.  fluticasone (FLONASE) 50 mcg/actuation nasal spray 2 Sprays by Both Nostrils route daily.  pantoprazole (PROTONIX) 40 mg tablet Take 1 Tab by mouth Daily (before breakfast). REPLACES PRILOSEC OR OTHER PPI'S DUE TO POTENTIAL INTERACTION WITH PLAVIX    atorvastatin (LIPITOR) 20 mg tablet Take 1 Tab by mouth daily. TO HELP PREVENT HEART ATTACK AND STROKE    albuterol (PROVENTIL VENTOLIN) 2.5 mg /3 mL (0.083 %) nebulizer solution by Nebulization route once.  zolpidem (AMBIEN) 5 mg tablet Take  by mouth nightly as needed for Sleep.  potassium chloride (K-DUR, KLOR-CON) 20 mEq tablet Take 20 mEq by mouth daily.  montelukast (SINGULAIR) 10 mg tablet Take 10 mg by mouth daily.  folic acid (FOLVITE) 1 mg tablet Take  by mouth daily.  amlodipine (NORVASC) 5 mg tablet Take 5 mg by mouth daily.  methotrexate (RHEUMATREX) 2.5 mg tablet Take 10 mg by mouth Every Friday. TAKES 4 TABLETS ONCE WEEKLY     No current facility-administered medications for this visit. Review of Symptoms:    CONST  No weight change. No fever, chills, sweats    ENT No visual changes, URI sx, sore throat    CV  See HPI   RESP  No cough, or sputum, wheezing. Also see HPI   GI  No abdominal pain or change in bowel habits. No heartburn or dysphagia. No melena or rectal bleeding.   No dysuria, urgency, frequency, hematuria   MSKEL  No joint pain, swelling. No muscle pain. SKIN  No rash or lesions. NEURO  No headache, syncope, or seizure. No weakness, loss of sensation, or paresthesias. PSYCH  No low mood or depression  No anxiety. HE/LYMPH  No easy bruising, abnormal bleeding, or enlarged glands.         Physical ExamPhysical Exam:    There were no vitals taken for this visit. Gen: NAD  HEENT:  PERRL, throat clear  Neck: no adenopathy, no thyromegaly, no JVD   Heart:  Regular,Nl S1S2,  no murmur, gallop or rub. Lungs:  clear  Abdomen:   Soft, non-tender, bowel sounds are active. Extremities:  No edema  Pulse: symmetric  Neuro: A&O times 3, No focal neuro deficits    Cardiographics    ECG:NSR, first degree AV block, otherwise normal    Labs:   Lab Results   Component Value Date/Time    Sodium 133 (L) 03/16/2016 04:30 AM    Sodium 134 (L) 03/15/2016 10:10 PM    Potassium 3.9 03/16/2016 04:30 AM    Potassium 4.1 03/15/2016 10:10 PM    Chloride 98 03/16/2016 04:30 AM    Chloride 98 03/15/2016 10:10 PM    CO2 26 03/16/2016 04:30 AM    CO2 27 03/15/2016 10:10 PM    Anion gap 9 03/16/2016 04:30 AM    Anion gap 9 03/15/2016 10:10 PM    Glucose 98 03/16/2016 04:30 AM    Glucose 154 (H) 03/15/2016 10:10 PM    BUN 8 03/16/2016 04:30 AM    BUN 7 03/15/2016 10:10 PM    Creatinine 0.53 (L) 03/16/2016 04:30 AM    Creatinine 0.68 (L) 03/15/2016 10:10 PM    BUN/Creatinine ratio 15 03/16/2016 04:30 AM    BUN/Creatinine ratio 10 (L) 03/15/2016 10:10 PM    GFR est AA >60 03/16/2016 04:30 AM    GFR est AA >60 03/15/2016 10:10 PM    GFR est non-AA >60 03/16/2016 04:30 AM    GFR est non-AA >60 03/15/2016 10:10 PM    Calcium 8.4 (L) 03/16/2016 04:30 AM    Calcium 8.3 (L) 03/15/2016 10:10 PM     No results found for: CPK, CPKX, CPX  Lab Results   Component Value Date/Time    Cholesterol, total 120 03/16/2016 04:30 AM    HDL Cholesterol 29 03/16/2016 04:30 AM    LDL, calculated 68.8 03/16/2016 04:30 AM    Triglyceride 111 03/16/2016 04:30 AM    CHOL/HDL Ratio 4.1 03/16/2016 04:30 AM     No results found for this or any previous visit.     Assessment:         Patient Active Problem List    Diagnosis Date Noted    CAD (coronary artery disease), native coronary artery 03/16/2016    S/P PTCA (percutaneous transluminal coronary angioplasty) 03/16/2016    Shortness of breath on exertion 03/15/2016  Myocardial ischemia 03/15/2016    DM (diabetes mellitus) ([facility] 02/09/2016    HTN (hypertension) 02/09/2016    Dyslipidemia 02/09/2016    GERD (gastroesophageal reflux disease) 02/09/2016    Ventricular bigeminy 02/09/2016    Paroxysmal atrial fibrillation ([facility] 02/09/2016      Hx ASCVD, CAD, s/p PCI/ANUSHA 2016, DM II, hypertension, PAF (in [facility] on [facility]),  dyslipidemia. No specific CV sx or complaints.  Continues to f/u with [facility]--OV, labs (reviewed).      Plan:     Doing well with no adverse cardiac symptoms. DM, Lipids and labs followed by PCP. Continue current care and f/u in 12 months (his preference), sooner prn. Tony Koenig MD

## 2022-12-01 NOTE — PROGRESS NOTES
Verified patient with two patient identifiers. Medications reviewed/approved by Dr. Destinee Zamora. A verbal from Dr. Destinee Zamora was given to remove any medications that were deleted during the visit. Medication(s) removed: none Chief Complaint Patient presents with  Coronary Artery Disease 6 month follow up  Irregular Heart Beat  Hypertension  Cholesterol Problem 1. Have you been to the ER, urgent care clinic since your last visit? Hospitalized since your last visit? no 
2. Have you seen or consulted any other health care providers outside of the [address] [place] [address] since your last visit? Include any pap smears or colon screening. Yes, last seen by Milan Turner NP 4 months ago. [phone]

## 2023-05-17 RX ORDER — ALBUTEROL SULFATE 2.5 MG/3ML
SOLUTION RESPIRATORY (INHALATION) ONCE
COMMUNITY

## 2023-05-17 RX ORDER — ASPIRIN 81 MG/1
81 TABLET ORAL DAILY
COMMUNITY
Start: 2017-04-03

## 2023-05-17 RX ORDER — PANTOPRAZOLE SODIUM 40 MG/1
40 TABLET, DELAYED RELEASE ORAL
COMMUNITY
Start: 2016-03-16

## 2023-05-17 RX ORDER — ZOLPIDEM TARTRATE 5 MG/1
TABLET ORAL
COMMUNITY

## 2023-05-17 RX ORDER — METFORMIN HYDROCHLORIDE 500 MG/1
1000 TABLET, EXTENDED RELEASE ORAL DAILY
COMMUNITY
Start: 2019-08-05

## 2023-05-17 RX ORDER — FLUTICASONE PROPIONATE 50 MCG
2 SPRAY, SUSPENSION (ML) NASAL DAILY
COMMUNITY

## 2023-05-17 RX ORDER — MONTELUKAST SODIUM 10 MG/1
10 TABLET ORAL DAILY
COMMUNITY

## 2023-05-17 RX ORDER — METOPROLOL SUCCINATE 25 MG/1
1 TABLET, EXTENDED RELEASE ORAL DAILY
COMMUNITY
Start: 2022-06-20

## 2023-05-17 RX ORDER — ESCITALOPRAM OXALATE 20 MG/1
20 TABLET ORAL DAILY
COMMUNITY

## 2023-05-17 RX ORDER — POTASSIUM CHLORIDE 20 MEQ/1
20 TABLET, EXTENDED RELEASE ORAL DAILY
COMMUNITY

## 2023-05-17 RX ORDER — ATORVASTATIN CALCIUM 20 MG/1
20 TABLET, FILM COATED ORAL DAILY
COMMUNITY
Start: 2016-03-16

## 2023-05-17 RX ORDER — FOLIC ACID 1 MG/1
TABLET ORAL DAILY
COMMUNITY

## 2023-05-17 RX ORDER — AMLODIPINE BESYLATE 5 MG/1
5 TABLET ORAL DAILY
COMMUNITY

## 2023-06-05 RX ORDER — METOPROLOL SUCCINATE 25 MG/1
TABLET, EXTENDED RELEASE ORAL
Qty: 90 TABLET | Refills: 3 | OUTPATIENT
Start: 2023-06-05